# Patient Record
Sex: MALE | Race: WHITE | Employment: OTHER | URBAN - METROPOLITAN AREA
[De-identification: names, ages, dates, MRNs, and addresses within clinical notes are randomized per-mention and may not be internally consistent; named-entity substitution may affect disease eponyms.]

---

## 2023-11-06 ENCOUNTER — PREP FOR PROCEDURE (OUTPATIENT)
Dept: ORTHOPEDIC SURGERY | Age: 63
End: 2023-11-06

## 2023-11-06 ENCOUNTER — OFFICE VISIT (OUTPATIENT)
Dept: ORTHOPEDIC SURGERY | Age: 63
End: 2023-11-06

## 2023-11-06 DIAGNOSIS — S42.332A CLOSED DISPLACED OBLIQUE FRACTURE OF SHAFT OF LEFT HUMERUS, INITIAL ENCOUNTER: ICD-10-CM

## 2023-11-06 DIAGNOSIS — M19.012 DEGENERATIVE JOINT DISEASE OF LEFT ACROMIOCLAVICULAR JOINT: ICD-10-CM

## 2023-11-06 DIAGNOSIS — S42.232A CLOSED 3-PART FRACTURE OF SURGICAL NECK OF LEFT HUMERUS, INITIAL ENCOUNTER: Primary | ICD-10-CM

## 2023-11-06 DIAGNOSIS — M19.012 OSTEOARTHRITIS OF LEFT GLENOHUMERAL JOINT: ICD-10-CM

## 2023-11-06 RX ORDER — SODIUM CHLORIDE 0.9 % (FLUSH) 0.9 %
5-40 SYRINGE (ML) INJECTION PRN
Status: CANCELLED | OUTPATIENT
Start: 2023-11-06

## 2023-11-06 RX ORDER — SODIUM CHLORIDE 9 MG/ML
INJECTION, SOLUTION INTRAVENOUS PRN
Status: CANCELLED | OUTPATIENT
Start: 2023-11-06

## 2023-11-06 RX ORDER — SODIUM CHLORIDE 0.9 % (FLUSH) 0.9 %
5-40 SYRINGE (ML) INJECTION EVERY 12 HOURS SCHEDULED
Status: CANCELLED | OUTPATIENT
Start: 2023-11-06

## 2023-11-06 NOTE — PROGRESS NOTES
threatens bodily function    Follow up: No follow-ups on file.      I25.781  M19.012  S42.904 [id number]    EtOH prophylaxis    Mushtaq Russell MD

## 2023-11-07 ENCOUNTER — TELEPHONE (OUTPATIENT)
Dept: ORTHOPEDIC SURGERY | Age: 63
End: 2023-11-07

## 2023-11-07 PROBLEM — S42.332A: Status: ACTIVE | Noted: 2023-11-07

## 2023-11-07 NOTE — H&P
Subjective:     Patient is a 58 y.o. RHD MALE WITH LEFT SHOULDER PAIN. SEE OFFICE NOTE. Patient Active Problem List    Diagnosis Date Noted    Closed 3-part fracture of surgical neck of left humerus 11/06/2023    Displaced oblique fracture of shaft of left humerus 11/07/2023    Osteoarthritis of left glenohumeral joint 11/06/2023     No past medical history on file. No past surgical history on file. Prior to Admission medications    Not on File     Not on File   Social History     Tobacco Use    Smoking status: Former     Types: Cigarettes    Smokeless tobacco: Never   Substance Use Topics    Alcohol use: Not on file      No family history on file. Review of Systems  Pertinent items are noted in HPI. Objective:     No data found. There were no vitals taken for this visit. General Appearance:    Alert, cooperative, no distress, appears stated age   Head:    Normocephalic, without obvious abnormality, atraumatic                       Back:     Symmetric, no curvature, ROM normal, no CVA tenderness   Lungs:     Clear to auscultation bilaterally, respirations unlabored   Chest wall:    No tenderness or deformity   Heart:    Regular rate and rhythm, S1 and S2 normal, no murmur, rub   or gallop               Extremities:   Extremities normal, atraumatic, no cyanosis or edema   Pulses:   2+ and symmetric all extremities   Skin:   Skin color, texture, turgor normal, no rashes or lesions   Lymph nodes:   Cervical, supraclavicular, and axillary nodes normal   Neurologic:   CNII-XII intact. Normal strength, sensation and reflexes       throughout           Assessment:     Active Problems:    Closed 3-part fracture of surgical neck of left humerus    Displaced oblique fracture of shaft of left humerus    Osteoarthritis of left glenohumeral joint  Resolved Problems:    * No resolved hospital problems. *      Plan:     The various methods of treatment have been discussed with the patient and family.

## 2023-11-07 NOTE — TELEPHONE ENCOUNTER
He is also wondering if he can get a refill of his Oxycodone sent to [facility] since he is having to move his surgery due to transportation issues.

## 2023-11-09 ENCOUNTER — TELEPHONE (OUTPATIENT)
Dept: ORTHOPEDIC SURGERY | Age: 63
End: 2023-11-09

## 2023-11-09 DIAGNOSIS — S42.232A CLOSED 3-PART FRACTURE OF SURGICAL NECK OF LEFT HUMERUS, INITIAL ENCOUNTER: Primary | ICD-10-CM

## 2023-11-09 RX ORDER — OXYCODONE HYDROCHLORIDE 10 MG/1
10 TABLET ORAL EVERY 6 HOURS PRN
Qty: 40 TABLET | Refills: 0 | Status: SHIPPED | OUTPATIENT
Start: 2023-11-09 | End: 2023-11-19

## 2023-11-09 NOTE — TELEPHONE ENCOUNTER
Called and spoke to pt's wife. They will use [employer] and rx was routed to P to go to another pharmacy.

## 2023-11-10 ENCOUNTER — HOSPITAL ENCOUNTER (OUTPATIENT)
Dept: SURGERY | Age: 63
Discharge: HOME | End: 2023-11-10
Payer: COMMERCIAL

## 2023-11-10 VITALS
OXYGEN SATURATION: 98 % | HEIGHT: 69 IN | HEART RATE: 109 BPM | SYSTOLIC BLOOD PRESSURE: 102 MMHG | DIASTOLIC BLOOD PRESSURE: 62 MMHG | BODY MASS INDEX: 24.82 KG/M2 | TEMPERATURE: 97.5 F | RESPIRATION RATE: 18 BRPM | WEIGHT: 167.6 LBS

## 2023-11-10 DIAGNOSIS — S42.232A CLOSED 3-PART FRACTURE OF SURGICAL NECK OF LEFT HUMERUS, INITIAL ENCOUNTER: ICD-10-CM

## 2023-11-10 LAB
ALBUMIN SERPL-MCNC: 3.1 G/DL (ref 3.2–4.6)
ALBUMIN/GLOB SERPL: 0.8 (ref 0.4–1.6)
ALP SERPL-CCNC: 58 U/L (ref 50–136)
ALT SERPL-CCNC: 15 U/L (ref 12–65)
ANION GAP SERPL CALC-SCNC: 10 MMOL/L (ref 2–11)
APPEARANCE UR: CLEAR
AST SERPL-CCNC: 24 U/L (ref 15–37)
BACTERIA URNS QL MICRO: NEGATIVE /HPF
BASOPHILS # BLD: 0.1 K/UL (ref 0–0.2)
BASOPHILS NFR BLD: 0 % (ref 0–2)
BILIRUB SERPL-MCNC: 0.5 MG/DL (ref 0.2–1.1)
BILIRUB UR QL: NEGATIVE
BUN SERPL-MCNC: 21 MG/DL (ref 8–23)
CALCIUM SERPL-MCNC: 9.9 MG/DL (ref 8.3–10.4)
CASTS URNS QL MICRO: ABNORMAL /LPF
CHLORIDE SERPL-SCNC: 102 MMOL/L (ref 101–110)
CO2 SERPL-SCNC: 22 MMOL/L (ref 21–32)
COLOR UR: ABNORMAL
CREAT SERPL-MCNC: 1.56 MG/DL (ref 0.8–1.5)
DIFFERENTIAL METHOD BLD: ABNORMAL
EOSINOPHIL # BLD: 0.4 K/UL (ref 0–0.8)
EOSINOPHIL NFR BLD: 2 % (ref 0.5–7.8)
EPI CELLS #/AREA URNS HPF: ABNORMAL /HPF
ERYTHROCYTE [DISTWIDTH] IN BLOOD BY AUTOMATED COUNT: 14.3 % (ref 11.9–14.6)
GLOBULIN SER CALC-MCNC: 3.7 G/DL (ref 2.8–4.5)
GLUCOSE SERPL-MCNC: 106 MG/DL (ref 65–100)
GLUCOSE UR STRIP.AUTO-MCNC: NEGATIVE MG/DL
HCT VFR BLD AUTO: 24.9 % (ref 41.1–50.3)
HGB BLD-MCNC: 8 G/DL (ref 13.6–17.2)
HGB UR QL STRIP: NEGATIVE
IMM GRANULOCYTES # BLD AUTO: 0.1 K/UL (ref 0–0.5)
IMM GRANULOCYTES NFR BLD AUTO: 1 % (ref 0–5)
INR PPP: 1.1
KETONES UR QL STRIP.AUTO: NEGATIVE MG/DL
LEUKOCYTE ESTERASE UR QL STRIP.AUTO: ABNORMAL
LYMPHOCYTES # BLD: 2.4 K/UL (ref 0.5–4.6)
LYMPHOCYTES NFR BLD: 15 % (ref 13–44)
MAGNESIUM SERPL-MCNC: 2.1 MG/DL (ref 1.8–2.4)
MCH RBC QN AUTO: 31.4 PG (ref 26.1–32.9)
MCHC RBC AUTO-ENTMCNC: 32.1 G/DL (ref 31.4–35)
MCV RBC AUTO: 97.6 FL (ref 82–102)
MONOCYTES # BLD: 1.2 K/UL (ref 0.1–1.3)
MONOCYTES NFR BLD: 7 % (ref 4–12)
NEUTS SEG # BLD: 12.1 K/UL (ref 1.7–8.2)
NEUTS SEG NFR BLD: 74 % (ref 43–78)
NITRITE UR QL STRIP.AUTO: NEGATIVE
NRBC # BLD: 0 K/UL (ref 0–0.2)
PH UR STRIP: 6.5 (ref 5–9)
PLATELET # BLD AUTO: 533 K/UL (ref 150–450)
PMV BLD AUTO: 9.9 FL (ref 9.4–12.3)
POTASSIUM SERPL-SCNC: 4.2 MMOL/L (ref 3.5–5.1)
PROT SERPL-MCNC: 6.8 G/DL (ref 6.3–8.2)
PROT UR STRIP-MCNC: NEGATIVE MG/DL
PROTHROMBIN TIME: 14.5 SEC (ref 12.6–14.3)
RBC # BLD AUTO: 2.55 M/UL (ref 4.23–5.6)
RBC #/AREA URNS HPF: ABNORMAL /HPF
SODIUM SERPL-SCNC: 134 MMOL/L (ref 133–143)
SP GR UR REFRACTOMETRY: 1.01 (ref 1–1.02)
UROBILINOGEN UR QL STRIP.AUTO: 0.2 EU/DL (ref 0.2–1)
WBC # BLD AUTO: 16.2 K/UL (ref 4.3–11.1)
WBC URNS QL MICRO: ABNORMAL /HPF

## 2023-11-10 PROCEDURE — 80053 COMPREHEN METABOLIC PANEL: CPT

## 2023-11-10 PROCEDURE — 83735 ASSAY OF MAGNESIUM: CPT

## 2023-11-10 PROCEDURE — 82306 VITAMIN D 25 HYDROXY: CPT

## 2023-11-10 PROCEDURE — 87641 MR-STAPH DNA AMP PROBE: CPT

## 2023-11-10 PROCEDURE — 81001 URINALYSIS AUTO W/SCOPE: CPT

## 2023-11-10 PROCEDURE — 85610 PROTHROMBIN TIME: CPT

## 2023-11-10 PROCEDURE — 85025 COMPLETE CBC W/AUTO DIFF WBC: CPT

## 2023-11-10 RX ORDER — FENOFIBRATE 145 MG/1
145 TABLET, COATED ORAL
Qty: 30 TABLET | Refills: 11 | COMMUNITY
Start: 2023-10-19 | End: 2024-10-18

## 2023-11-10 RX ORDER — ASPIRIN 81 MG/1
81 TABLET ORAL
COMMUNITY

## 2023-11-10 RX ORDER — ROSUVASTATIN CALCIUM 20 MG/1
20 TABLET, COATED ORAL
Qty: 30 TABLET | Refills: 11 | COMMUNITY
Start: 2023-10-19 | End: 2024-10-18

## 2023-11-10 RX ORDER — PROBENECID AND COLCHICINE 500; .5 MG/1; MG/1
1 TABLET ORAL 2 TIMES DAILY PRN
COMMUNITY
Start: 2023-06-07

## 2023-11-10 RX ORDER — FOLIC ACID 1 MG/1
1 TABLET ORAL
Qty: 30 TABLET | Refills: 11 | COMMUNITY
Start: 2023-06-07 | End: 2024-06-06

## 2023-11-10 RX ORDER — LANOLIN ALCOHOL/MO/W.PET/CERES
100 CREAM (GRAM) TOPICAL
Qty: 30 TABLET | Refills: 11 | COMMUNITY
Start: 2023-06-07 | End: 2024-06-06

## 2023-11-10 RX ORDER — PSEUDOEPHEDRINE HCL 30 MG
100 TABLET ORAL 2 TIMES DAILY
COMMUNITY
Start: 2023-11-06 | End: 2023-11-13

## 2023-11-10 RX ORDER — CALCIUM CARBONATE/VITAMIN D3 600 MG-10
1 TABLET ORAL
COMMUNITY
Start: 2023-06-07

## 2023-11-10 RX ORDER — FAMOTIDINE 20 MG/1
20 TABLET, FILM COATED ORAL 2 TIMES DAILY
Qty: 60 TABLET | Refills: 11 | COMMUNITY
Start: 2023-06-07 | End: 2024-06-06

## 2023-11-10 RX ORDER — METOPROLOL SUCCINATE 100 MG/1
100 TABLET, EXTENDED RELEASE ORAL
Qty: 30 TABLET | Refills: 11 | COMMUNITY
Start: 2023-10-19 | End: 2024-10-18

## 2023-11-10 RX ORDER — MAGNESIUM OXIDE 400 MG/1
400 TABLET ORAL 2 TIMES DAILY
COMMUNITY

## 2023-11-10 RX ORDER — OMEPRAZOLE 20 MG/1
20 CAPSULE, DELAYED RELEASE ORAL 2 TIMES DAILY
COMMUNITY
Start: 2023-06-07

## 2023-11-10 ASSESSMENT — PAIN DESCRIPTION - LOCATION: LOCATION: SHOULDER;ARM

## 2023-11-10 ASSESSMENT — PAIN DESCRIPTION - DESCRIPTORS: DESCRIPTORS: ACHING;SHARP;STABBING

## 2023-11-10 ASSESSMENT — PAIN SCALES - GENERAL: PAINLEVEL_OUTOF10: 8

## 2023-11-10 ASSESSMENT — PAIN DESCRIPTION - ORIENTATION: ORIENTATION: LEFT

## 2023-11-10 NOTE — PERIOP NOTE
Patient verified name and . Order for consent found in EHR and matches case posting; patient verified. Type 3 surgery, walk in assessment complete. Labs per surgeon: CBC, CMP, PT/INR, MAG, U/A, Vit-D ; results pending. POC Glucose, T&C, Hgb-A1c to be collected dos. Labs per anesthesia protocol: no additional labs needed. EKG: last completed on 23 at [facility]. Fax sent to medical records requesting 2 most recent tracings. Echo (23), Heart cath (20), [facility] note (10/19/23), and PCP note (23) available in EHR for reference. Anesthesia to review cardiac records once EKG's are received. MRSA/MSSA swab collected; pharmacy to review and dose antibiotic as appropriate. Hospital approved surgical skin cleanser and instructions to return bottle on DOS given per hospital policy. Patient provided with handouts including Guide to Surgery, Pain Management, Hand Hygiene, Blood Transfusion Education, and [facility] Brochure. Patient answered medical/surgical history questions at their best of ability. All prior to admission medications documented in [facility]. Original medication prescription bottles NOT visualized during patient appointment. Patient instructed to hold all vitamins 3 weeks prior to surgery and NSAIDS 5 days prior to surgery. Patient teach back successful and patient demonstrates knowledge of instruction.

## 2023-11-10 NOTE — PERIOP NOTE
Received Rhythm strip (10/30/23) and EKG tracing's (7/4/23 & 3/28/23) from [facility]. Records scanned under media tab for reference.

## 2023-11-10 NOTE — PERIOP NOTE
PLEASE CONTINUE TAKING ALL PRESCRIPTION MEDICATIONS UP TO THE DAY OF SURGERY UNLESS OTHERWISE DIRECTED BELOW. You may take Tylenol, allergy, and/or indigestion medications. TAKE ONLY THESE MEDICATIONS ON THE DAY OF SURGERY ON 11/16/23      81 mg Aspirin, Famotidine, Metoprolol, Omeprazole, Oxycodone if needed,     Rosuvastatin, Entresto, Colchicine-Probenecid if needed          DISCONTINUE all vitamins, herbals, and supplements 7 days prior to surgery. DISCONTINUE Non-Steroidal Anti-Inflammatory (NSAIDS) such as Advil and Aleve 5 days prior to surgery. Home Medications to Hold- please continue all other medications except these. Stop Calcium/Vit-D        Comments      On the day before surgery (11/15/23) please take 2 Tylenol in the morning and then again before bed. You may use either regular or extra strength. Bring: Photo ID, Insurance card, Radha-hex soap, Incentive Spirometer        Please do not bring home medications with you on the day of surgery unless otherwise directed by your nurse. If you are instructed to bring home medications, please give them to your nurse as they will be administered by the nursing staff. If you have any questions, please call [address] [place] [address] [phone]. A copy of this note was provided to the patient for reference.

## 2023-11-10 NOTE — PERIOP NOTE
CBC, CMP, PT/INR, MAG, U/A are within anesthesia guidelines except for low HGB-8.0, elevated Creatinine-1.56, and elevated PT-14.5; anesthesia to review. Labs automatically routed to ordering provider via Epic documentation. Staff message sent to [facility] at Dr. Salazar Beverage office to inform them of elevated WBC count-16.2 & low HGB-8.0. No

## 2023-11-10 NOTE — PROGRESS NOTES
Total Joint Surgery Preoperative Chart Review      Patient ID:  Patt Armstrong  [id number]  87 y.o.  1960  Surgeon: Dr. Adriana Cabrera  Date of Surgery: 2023  Procedure: Total Left Shoulder Arthroplasty  Primary Care Physician: DEV Ware [phone]  Specialty Physician(s):      Subjective:   Patt Armstrong is a 58 y.o. White (non-) male who presents for preoperative evaluation for Total Left Shoulder arthroplasty. This is a preoperative chart review note based on data collected by the nurse at the surgical Pre-Assessment visit. Past Medical History:   Diagnosis Date    Acute pancreatitis     x1    Chronic pain     shoulders, lower back    Congestive heart failure (CHF) (HCC)     Coronary artery disease involving native coronary artery of native heart without angina pectoris     DDD (degenerative disc disease), lumbar     GERD (gastroesophageal reflux disease)     Gout     Hypercholesteremia     Hypertension     BRIDGER (iron deficiency anemia)     Left bundle branch block       Past Surgical History:   Procedure Laterality Date    CARDIAC CATHETERIZATION  2020    COLONOSCOPY  2021     History reviewed. No pertinent family history. Social History     Tobacco Use    Smoking status: Former     Packs/day: .75     Types: Cigarettes     Start date: 64     Quit date:      Years since quittin.8     Passive exposure: Past    Smokeless tobacco: Never   Substance Use Topics    Alcohol use: Yes     Comment: 14 drinks per week       Prior to Admission medications    Medication Sig Start Date End Date Taking?  Authorizing Provider   calcium carb-cholecalciferol 600-10 MG-MCG TABS per tab Take 1 tablet by mouth daily (with breakfast) 23  Yes Provider, MD Mukesh   colchicine-probenecid 0.5-500 MG per tablet Take 1 tablet by mouth 2 times daily as needed 23  Yes ProviderMukesh MD   docusate (COLACE, DULCOLAX) 100 MG CAPS Take 100 mg by mouth 2 4464 hour(s)). Data Review:   Labs:   Labs pending    Problem List:  )  Patient Active Problem List   Diagnosis    Closed 3-part fracture of surgical neck of left humerus    Osteoarthritis of left glenohumeral joint    Displaced oblique fracture of shaft of left humerus       Total Joint Surgery Pre-Assessment Recommendations:           Continuous saturation monitoring UPON ARRIVAL TO FLOOR. Heated high flow lung expansion x 24 hours and prn.   IP RT consult for respiratory evaluation every shift      Signed By: DEV Monson - CNP-C    November 10, 2023

## 2023-11-11 LAB
25(OH)D3 SERPL-MCNC: 26 NG/ML (ref 30–100)
MRSA DNA SPEC QL NAA+PROBE: NOT DETECTED
S AUREUS CPE NOSE QL NAA+PROBE: NOT DETECTED

## 2023-11-12 NOTE — H&P
Subjective:     Patient is a 58 y.o. RHD MALE WITH LEFT SHOULDER PAIN    SEE OFFICE NOTE. Patient Active Problem List    Diagnosis Date Noted    Closed 3-part fracture of surgical neck of left humerus 11/06/2023    Displaced oblique fracture of shaft of left humerus 11/07/2023    Osteoarthritis of left glenohumeral joint 11/06/2023     Past Medical History:   Diagnosis Date    Acute pancreatitis     x1    Alcohol abuse, daily use     as of 11/10/23 patient states about 14 drinks per week    Chronic pain     shoulders, lower back    Congestive heart failure (CHF) ([facility])     EF=45 - 50% on Echo dated 9/19/23    Coronary artery disease involving native coronary artery of native heart without angina pectoris     followed by [facility]    DDD (degenerative disc disease), lumbar     Dilated cardiomyopathy ([address])     Folic acid deficiency     Former smoker     GERD (gastroesophageal reflux disease)     on meds for control    Gout     prn med    Hypercholesteremia     on med for control    Hypertension     on med for control    Hypomagnesemia     BRIDGER (iron deficiency anemia)     not currently taking Iron    Left bundle branch block       Past Surgical History:   Procedure Laterality Date    CARDIAC CATHETERIZATION  07/28/2020    no intervention    COLONOSCOPY  01/11/2021      Prior to Admission medications    Medication Sig Start Date End Date Taking?  Authorizing Provider   magnesium oxide (MAG-OX) 400 MG tablet Take 1 tablet by mouth in the morning and at bedtime    Mukesh Valdez MD   aspirin 81 MG EC tablet Take 1 tablet by mouth Daily with lunch    Mukesh Valdez MD   calcium carb-cholecalciferol 600-10 MG-MCG TABS per tab Take 1 tablet by mouth daily (with breakfast) 6/7/23   Mukesh Valdez MD   colchicine-probenecid 0.5-500 MG per tablet Take 1 tablet by mouth 2 times daily as needed 6/7/23   Mukesh Valdez MD   docusate (COLACE, DULCOLAX) 100 MG CAPS Take 100 mg by mouth 2 times daily 23  Mukesh Valdez MD   famotidine (PEPCID) 20 MG tablet Take 1 tablet by mouth 2 times daily 23  Mukesh Valdez MD   fenofibrate (TRICOR) 145 MG tablet Take 1 tablet by mouth Daily with lunch 10/19/23 10/18/24  Mukesh Valdez MD   folic acid (FOLVITE) 1 MG tablet Take 1 tablet by mouth Daily with lunch 23  Mukesh Valdez MD   metoprolol succinate (TOPROL XL) 100 MG extended release tablet Take 1 tablet by mouth Daily with lunch 10/19/23 10/18/24  Mukesh Valdez MD   omeprazole (PRILOSEC) 20 MG delayed release capsule Take 1 capsule by mouth in the morning and at bedtime 23   Mukesh Valdez MD   rosuvastatin (CRESTOR) 20 MG tablet Take 1 tablet by mouth Daily with lunch 10/19/23 10/18/24  Mukesh Valdez MD   sacubitril-valsartan (ENTRESTO) 24-26 MG per tablet Take 1 tablet by mouth 2 times daily 10/19/23 10/18/24  Mukesh Valdez MD   thiamine 100 MG tablet Take 1 tablet by mouth Daily with lunch 23  Mukesh Valdez MD   oxyCODONE HCl (OXY-IR) 10 MG immediate release tablet Take 1 tablet by mouth every 6 hours as needed for Pain for up to 10 days. Max Daily Amount: 40 mg 23  Howard Malin MD   oxyCODONE HCl (OXY-IR) 10 MG immediate release tablet Take 1 tablet by mouth every 6 hours as needed for Pain for up to 10 days. Max Daily Amount: 40 mg 23  Howard Malin MD     No Known Allergies   Social History     Tobacco Use    Smoking status: Former     Packs/day: 0.75     Years: 28.00     Additional pack years: 0.00     Total pack years: 21.00     Types: Cigarettes     Start date: 64     Quit date:      Years since quittin.8     Passive exposure: Past    Smokeless tobacco: Never   Substance Use Topics    Alcohol use: Yes     Comment: 14 drinks per week; mostly liquor      No family history on file.    Review of Systems  Pertinent items are noted in

## 2023-11-13 NOTE — PROGRESS NOTES
Dr. Joe Payne reviewed chart. New order received \"repeat Hgb, INR, T&S DOS. \" [facility] to proceed.

## 2023-11-15 ENCOUNTER — ANESTHESIA EVENT (OUTPATIENT)
Dept: SURGERY | Age: 63
End: 2023-11-15
Payer: COMMERCIAL

## 2023-11-16 ENCOUNTER — HOME HEALTH ADMISSION (OUTPATIENT)
Dept: HOME HEALTH SERVICES | Facility: HOME HEALTH | Age: 63
End: 2023-11-16
Payer: COMMERCIAL

## 2023-11-16 ENCOUNTER — APPOINTMENT (OUTPATIENT)
Dept: GENERAL RADIOLOGY | Age: 63
DRG: 483 | End: 2023-11-16
Attending: ORTHOPAEDIC SURGERY
Payer: COMMERCIAL

## 2023-11-16 ENCOUNTER — ANESTHESIA (OUTPATIENT)
Dept: SURGERY | Age: 63
End: 2023-11-16
Payer: COMMERCIAL

## 2023-11-16 ENCOUNTER — HOSPITAL ENCOUNTER (INPATIENT)
Age: 63
LOS: 2 days | Discharge: HOME | DRG: 483 | End: 2023-11-18
Attending: ORTHOPAEDIC SURGERY | Admitting: ORTHOPAEDIC SURGERY
Payer: COMMERCIAL

## 2023-11-16 DIAGNOSIS — S42.232A CLOSED 3-PART FRACTURE OF SURGICAL NECK OF LEFT HUMERUS, INITIAL ENCOUNTER: ICD-10-CM

## 2023-11-16 PROBLEM — I50.9 HEART FAILURE (HCC): Status: ACTIVE | Noted: 2023-11-16

## 2023-11-16 PROBLEM — I10 PRIMARY HYPERTENSION: Status: ACTIVE | Noted: 2023-11-16

## 2023-11-16 PROBLEM — D50.9 IDA (IRON DEFICIENCY ANEMIA): Status: ACTIVE | Noted: 2023-11-16

## 2023-11-16 PROBLEM — E78.2 MIXED HYPERLIPIDEMIA: Status: ACTIVE | Noted: 2023-11-16

## 2023-11-16 PROBLEM — K21.9 GASTROESOPHAGEAL REFLUX DISEASE WITHOUT ESOPHAGITIS: Status: ACTIVE | Noted: 2023-11-16

## 2023-11-16 LAB
ABO + RH BLD: NORMAL
ANION GAP SERPL CALC-SCNC: 7 MMOL/L (ref 2–11)
BASOPHILS # BLD: 0.1 K/UL (ref 0–0.2)
BASOPHILS NFR BLD: 0 % (ref 0–2)
BLOOD GROUP ANTIBODIES SERPL: NORMAL
BUN SERPL-MCNC: 17 MG/DL (ref 8–23)
CALCIUM SERPL-MCNC: 9.5 MG/DL (ref 8.3–10.4)
CHLORIDE SERPL-SCNC: 104 MMOL/L (ref 101–110)
CO2 SERPL-SCNC: 21 MMOL/L (ref 21–32)
CREAT SERPL-MCNC: 1.74 MG/DL (ref 0.8–1.5)
DIFFERENTIAL METHOD BLD: ABNORMAL
EOSINOPHIL # BLD: 0.1 K/UL (ref 0–0.8)
EOSINOPHIL NFR BLD: 1 % (ref 0.5–7.8)
ERYTHROCYTE [DISTWIDTH] IN BLOOD BY AUTOMATED COUNT: 14.7 % (ref 11.9–14.6)
EST. AVERAGE GLUCOSE BLD GHB EST-MCNC: 88 MG/DL
GLUCOSE BLD STRIP.AUTO-MCNC: 108 MG/DL (ref 65–100)
GLUCOSE SERPL-MCNC: 133 MG/DL (ref 65–100)
HBA1C MFR BLD: 4.7 % (ref 4.8–5.6)
HCT VFR BLD AUTO: 26.8 % (ref 41.1–50.3)
HGB BLD-MCNC: 8.5 G/DL (ref 13.6–17.2)
HGB BLD-MCNC: 9.5 G/DL (ref 13.6–17.2)
IMM GRANULOCYTES # BLD AUTO: 0.1 K/UL (ref 0–0.5)
IMM GRANULOCYTES NFR BLD AUTO: 1 % (ref 0–5)
INR BLD: 1.1 (ref 0.9–1.2)
LYMPHOCYTES # BLD: 1.2 K/UL (ref 0.5–4.6)
LYMPHOCYTES NFR BLD: 10 % (ref 13–44)
MAGNESIUM SERPL-MCNC: 2 MG/DL (ref 1.8–2.4)
MCH RBC QN AUTO: 30.7 PG (ref 26.1–32.9)
MCHC RBC AUTO-ENTMCNC: 31.7 G/DL (ref 31.4–35)
MCV RBC AUTO: 96.8 FL (ref 82–102)
MONOCYTES # BLD: 0.6 K/UL (ref 0.1–1.3)
MONOCYTES NFR BLD: 5 % (ref 4–12)
NEUTS SEG # BLD: 10.3 K/UL (ref 1.7–8.2)
NEUTS SEG NFR BLD: 83 % (ref 43–78)
NRBC # BLD: 0 K/UL (ref 0–0.2)
PLATELET # BLD AUTO: 579 K/UL (ref 150–450)
PMV BLD AUTO: 9.3 FL (ref 9.4–12.3)
POTASSIUM SERPL-SCNC: 4.3 MMOL/L (ref 3.5–5.1)
PT BLD: 13.5 SECS (ref 9.6–11.6)
RBC # BLD AUTO: 2.77 M/UL (ref 4.23–5.6)
SERVICE CMNT-IMP: ABNORMAL
SODIUM SERPL-SCNC: 132 MMOL/L (ref 133–143)
SPECIMEN EXP DATE BLD: NORMAL
WBC # BLD AUTO: 12.4 K/UL (ref 4.3–11.1)

## 2023-11-16 PROCEDURE — 73060 X-RAY EXAM OF HUMERUS: CPT

## 2023-11-16 PROCEDURE — 6360000002 HC RX W HCPCS: Performed by: ANESTHESIOLOGY

## 2023-11-16 PROCEDURE — 6370000000 HC RX 637 (ALT 250 FOR IP): Performed by: ORTHOPAEDIC SURGERY

## 2023-11-16 PROCEDURE — 64415 NJX AA&/STRD BRCH PLXS IMG: CPT | Performed by: ANESTHESIOLOGY

## 2023-11-16 PROCEDURE — 1100000000 HC RM PRIVATE

## 2023-11-16 PROCEDURE — 85018 HEMOGLOBIN: CPT

## 2023-11-16 PROCEDURE — 2500000003 HC RX 250 WO HCPCS: Performed by: ANESTHESIOLOGY

## 2023-11-16 PROCEDURE — 0PSG04Z REPOSITION LEFT HUMERAL SHAFT WITH INTERNAL FIXATION DEVICE, OPEN APPROACH: ICD-10-PCS | Performed by: ORTHOPAEDIC SURGERY

## 2023-11-16 PROCEDURE — 83036 HEMOGLOBIN GLYCOSYLATED A1C: CPT

## 2023-11-16 PROCEDURE — 3600000015 HC SURGERY LEVEL 5 ADDTL 15MIN: Performed by: ORTHOPAEDIC SURGERY

## 2023-11-16 PROCEDURE — 3700000000 HC ANESTHESIA ATTENDED CARE: Performed by: ORTHOPAEDIC SURGERY

## 2023-11-16 PROCEDURE — 85025 COMPLETE CBC W/AUTO DIFF WBC: CPT

## 2023-11-16 PROCEDURE — 6370000000 HC RX 637 (ALT 250 FOR IP): Performed by: ANESTHESIOLOGY

## 2023-11-16 PROCEDURE — 2500000003 HC RX 250 WO HCPCS: Performed by: NURSE ANESTHETIST, CERTIFIED REGISTERED

## 2023-11-16 PROCEDURE — C1713 ANCHOR/SCREW BN/BN,TIS/BN: HCPCS | Performed by: ORTHOPAEDIC SURGERY

## 2023-11-16 PROCEDURE — 86900 BLOOD TYPING SEROLOGIC ABO: CPT

## 2023-11-16 PROCEDURE — 3700000001 HC ADD 15 MINUTES (ANESTHESIA): Performed by: ORTHOPAEDIC SURGERY

## 2023-11-16 PROCEDURE — 6360000002 HC RX W HCPCS: Performed by: ORTHOPAEDIC SURGERY

## 2023-11-16 PROCEDURE — 0RHK04Z INSERTION OF INTERNAL FIXATION DEVICE INTO LEFT SHOULDER JOINT, OPEN APPROACH: ICD-10-PCS | Performed by: ORTHOPAEDIC SURGERY

## 2023-11-16 PROCEDURE — 86850 RBC ANTIBODY SCREEN: CPT

## 2023-11-16 PROCEDURE — 2580000003 HC RX 258: Performed by: ANESTHESIOLOGY

## 2023-11-16 PROCEDURE — 86901 BLOOD TYPING SEROLOGIC RH(D): CPT

## 2023-11-16 PROCEDURE — 2580000003 HC RX 258: Performed by: NURSE ANESTHETIST, CERTIFIED REGISTERED

## 2023-11-16 PROCEDURE — 3E0T3BZ INTRODUCTION OF ANESTHETIC AGENT INTO PERIPHERAL NERVES AND PLEXI, PERCUTANEOUS APPROACH: ICD-10-PCS | Performed by: ANESTHESIOLOGY

## 2023-11-16 PROCEDURE — 82962 GLUCOSE BLOOD TEST: CPT

## 2023-11-16 PROCEDURE — 94760 N-INVAS EAR/PLS OXIMETRY 1: CPT

## 2023-11-16 PROCEDURE — 85610 PROTHROMBIN TIME: CPT

## 2023-11-16 PROCEDURE — 2709999900 HC NON-CHARGEABLE SUPPLY: Performed by: ORTHOPAEDIC SURGERY

## 2023-11-16 PROCEDURE — 94761 N-INVAS EAR/PLS OXIMETRY MLT: CPT

## 2023-11-16 PROCEDURE — 3600000005 HC SURGERY LEVEL 5 BASE: Performed by: ORTHOPAEDIC SURGERY

## 2023-11-16 PROCEDURE — 83735 ASSAY OF MAGNESIUM: CPT

## 2023-11-16 PROCEDURE — 73030 X-RAY EXAM OF SHOULDER: CPT

## 2023-11-16 PROCEDURE — 7100000001 HC PACU RECOVERY - ADDTL 15 MIN: Performed by: ORTHOPAEDIC SURGERY

## 2023-11-16 PROCEDURE — 2500000003 HC RX 250 WO HCPCS: Performed by: ORTHOPAEDIC SURGERY

## 2023-11-16 PROCEDURE — 0RRK00Z REPLACEMENT OF LEFT SHOULDER JOINT WITH REVERSE BALL AND SOCKET SYNTHETIC SUBSTITUTE, OPEN APPROACH: ICD-10-PCS | Performed by: ORTHOPAEDIC SURGERY

## 2023-11-16 PROCEDURE — 6360000002 HC RX W HCPCS: Performed by: NURSE ANESTHETIST, CERTIFIED REGISTERED

## 2023-11-16 PROCEDURE — C1776 JOINT DEVICE (IMPLANTABLE): HCPCS | Performed by: ORTHOPAEDIC SURGERY

## 2023-11-16 PROCEDURE — 2580000003 HC RX 258: Performed by: ORTHOPAEDIC SURGERY

## 2023-11-16 PROCEDURE — 0LS40ZZ REPOSITION LEFT UPPER ARM TENDON, OPEN APPROACH: ICD-10-PCS | Performed by: ORTHOPAEDIC SURGERY

## 2023-11-16 PROCEDURE — 2700000000 HC OXYGEN THERAPY PER DAY

## 2023-11-16 PROCEDURE — 7100000000 HC PACU RECOVERY - FIRST 15 MIN: Performed by: ORTHOPAEDIC SURGERY

## 2023-11-16 PROCEDURE — 2720000010 HC SURG SUPPLY STERILE: Performed by: ORTHOPAEDIC SURGERY

## 2023-11-16 PROCEDURE — 80048 BASIC METABOLIC PNL TOTAL CA: CPT

## 2023-11-16 DEVICE — SPACER HUM +9MM OFFSET SHLDR POLYETH FOR DELT XTEND REV SYS: Type: IMPLANTABLE DEVICE | Site: SHOULDER | Status: FUNCTIONAL

## 2023-11-16 DEVICE — SCREW BNE L50MM DIA4.5MM PROX CORT TIB S STL ST LOK FULL: Type: IMPLANTABLE DEVICE | Site: SHOULDER | Status: FUNCTIONAL

## 2023-11-16 DEVICE — CUP HUM DIA38MM +9MM OFFSET STD SHLDR POLYETH DELT XTEND: Type: IMPLANTABLE DEVICE | Site: SHOULDER | Status: FUNCTIONAL

## 2023-11-16 DEVICE — STEM HUM SZ 1 DIA8MM LNG CO CHROM CEM MONOBLOC EPIPHYSIS: Type: IMPLANTABLE DEVICE | Site: SHOULDER | Status: FUNCTIONAL

## 2023-11-16 DEVICE — CEMENT BNE 20ML 41GM FULL DOSE PMMA W/ TOBRA M VISC RADPQ: Type: IMPLANTABLE DEVICE | Site: SHOULDER | Status: FUNCTIONAL

## 2023-11-16 DEVICE — SCREW BNE L40MM DIA4.5MM PROX CORT TIB S STL ST LOK FULL: Type: IMPLANTABLE DEVICE | Site: SHOULDER | Status: FUNCTIONAL

## 2023-11-16 DEVICE — SCREW BNE L18MM DIA4.5MM PROX CORT TIB S STL ST LOK FULL: Type: IMPLANTABLE DEVICE | Site: SHOULDER | Status: FUNCTIONAL

## 2023-11-16 DEVICE — XTND GLENO ECC D38MM +6MM: Type: IMPLANTABLE DEVICE | Site: SHOULDER | Status: FUNCTIONAL

## 2023-11-16 DEVICE — COMPONENT GLEN FIX DIA10MM SHLDR METAGLENE LNG PEG GLOB: Type: IMPLANTABLE DEVICE | Site: SHOULDER | Status: FUNCTIONAL

## 2023-11-16 DEVICE — PLUG, CEMENT SZ. 8.0
Type: IMPLANTABLE DEVICE | Site: SHOULDER | Status: FUNCTIONAL
Brand: DJO SURGICAL

## 2023-11-16 RX ORDER — ROCURONIUM BROMIDE 10 MG/ML
INJECTION, SOLUTION INTRAVENOUS PRN
Status: DISCONTINUED | OUTPATIENT
Start: 2023-11-16 | End: 2023-11-16 | Stop reason: SDUPTHER

## 2023-11-16 RX ORDER — SODIUM CHLORIDE 9 MG/ML
INJECTION, SOLUTION INTRAVENOUS PRN
Status: DISCONTINUED | OUTPATIENT
Start: 2023-11-16 | End: 2023-11-16 | Stop reason: HOSPADM

## 2023-11-16 RX ORDER — METOPROLOL SUCCINATE 25 MG/1
100 TABLET, EXTENDED RELEASE ORAL
Status: DISCONTINUED | OUTPATIENT
Start: 2023-11-17 | End: 2023-11-16

## 2023-11-16 RX ORDER — SODIUM CHLORIDE, SODIUM LACTATE, POTASSIUM CHLORIDE, CALCIUM CHLORIDE 600; 310; 30; 20 MG/100ML; MG/100ML; MG/100ML; MG/100ML
INJECTION, SOLUTION INTRAVENOUS CONTINUOUS
Status: DISCONTINUED | OUTPATIENT
Start: 2023-11-16 | End: 2023-11-16 | Stop reason: HOSPADM

## 2023-11-16 RX ORDER — FENOFIBRATE 160 MG/1
160 TABLET ORAL
Status: DISCONTINUED | OUTPATIENT
Start: 2023-11-17 | End: 2023-11-16

## 2023-11-16 RX ORDER — DOCUSATE SODIUM 100 MG/1
100 CAPSULE, LIQUID FILLED ORAL 2 TIMES DAILY
Status: DISCONTINUED | OUTPATIENT
Start: 2023-11-17 | End: 2023-11-18 | Stop reason: HOSPADM

## 2023-11-16 RX ORDER — FOLIC ACID 1 MG/1
1 TABLET ORAL DAILY
Status: DISCONTINUED | OUTPATIENT
Start: 2023-11-16 | End: 2023-11-18 | Stop reason: HOSPADM

## 2023-11-16 RX ORDER — LIDOCAINE HYDROCHLORIDE 20 MG/ML
INJECTION, SOLUTION EPIDURAL; INFILTRATION; INTRACAUDAL; PERINEURAL PRN
Status: DISCONTINUED | OUTPATIENT
Start: 2023-11-16 | End: 2023-11-16 | Stop reason: SDUPTHER

## 2023-11-16 RX ORDER — OXYCODONE HYDROCHLORIDE 5 MG/1
10 TABLET ORAL
Status: DISCONTINUED | OUTPATIENT
Start: 2023-11-16 | End: 2023-11-18 | Stop reason: HOSPADM

## 2023-11-16 RX ORDER — ESMOLOL HYDROCHLORIDE 10 MG/ML
INJECTION, SOLUTION INTRAVENOUS PRN
Status: DISCONTINUED | OUTPATIENT
Start: 2023-11-16 | End: 2023-11-16 | Stop reason: SDUPTHER

## 2023-11-16 RX ORDER — SODIUM CHLORIDE 0.9 % (FLUSH) 0.9 %
5-40 SYRINGE (ML) INJECTION PRN
Status: DISCONTINUED | OUTPATIENT
Start: 2023-11-16 | End: 2023-11-18 | Stop reason: HOSPADM

## 2023-11-16 RX ORDER — FENOFIBRATE 160 MG/1
160 TABLET ORAL
Status: DISCONTINUED | OUTPATIENT
Start: 2023-11-16 | End: 2023-11-18 | Stop reason: HOSPADM

## 2023-11-16 RX ORDER — PROMETHAZINE HYDROCHLORIDE 25 MG/1
25 TABLET ORAL EVERY 4 HOURS PRN
Status: DISCONTINUED | OUTPATIENT
Start: 2023-11-16 | End: 2023-11-18 | Stop reason: HOSPADM

## 2023-11-16 RX ORDER — ASPIRIN 81 MG/1
81 TABLET ORAL
Status: DISCONTINUED | OUTPATIENT
Start: 2023-11-17 | End: 2023-11-18 | Stop reason: HOSPADM

## 2023-11-16 RX ORDER — SODIUM CHLORIDE 9 MG/ML
INJECTION, SOLUTION INTRAVENOUS PRN
Status: DISCONTINUED | OUTPATIENT
Start: 2023-11-16 | End: 2023-11-18 | Stop reason: HOSPADM

## 2023-11-16 RX ORDER — EPHEDRINE SULFATE/0.9% NACL/PF 50 MG/5 ML
SYRINGE (ML) INTRAVENOUS PRN
Status: DISCONTINUED | OUTPATIENT
Start: 2023-11-16 | End: 2023-11-16 | Stop reason: SDUPTHER

## 2023-11-16 RX ORDER — SODIUM CHLORIDE 9 MG/ML
INJECTION, SOLUTION INTRAVENOUS PRN
Status: DISCONTINUED | OUTPATIENT
Start: 2023-11-16 | End: 2023-11-16

## 2023-11-16 RX ORDER — SODIUM CHLORIDE, SODIUM LACTATE, POTASSIUM CHLORIDE, CALCIUM CHLORIDE 600; 310; 30; 20 MG/100ML; MG/100ML; MG/100ML; MG/100ML
INJECTION, SOLUTION INTRAVENOUS CONTINUOUS
Status: DISCONTINUED | OUTPATIENT
Start: 2023-11-16 | End: 2023-11-18 | Stop reason: HOSPADM

## 2023-11-16 RX ORDER — PROPOFOL 10 MG/ML
INJECTION, EMULSION INTRAVENOUS PRN
Status: DISCONTINUED | OUTPATIENT
Start: 2023-11-16 | End: 2023-11-16 | Stop reason: SDUPTHER

## 2023-11-16 RX ORDER — SODIUM CHLORIDE 0.9 % (FLUSH) 0.9 %
5-40 SYRINGE (ML) INJECTION PRN
Status: DISCONTINUED | OUTPATIENT
Start: 2023-11-16 | End: 2023-11-16

## 2023-11-16 RX ORDER — SODIUM CHLORIDE 0.9 % (FLUSH) 0.9 %
5-40 SYRINGE (ML) INJECTION EVERY 12 HOURS SCHEDULED
Status: DISCONTINUED | OUTPATIENT
Start: 2023-11-16 | End: 2023-11-16 | Stop reason: HOSPADM

## 2023-11-16 RX ORDER — ENEMA 19; 7 G/133ML; G/133ML
1 ENEMA RECTAL
Status: DISPENSED | OUTPATIENT
Start: 2023-11-16 | End: 2023-11-17

## 2023-11-16 RX ORDER — SODIUM CHLORIDE 0.9 % (FLUSH) 0.9 %
5-40 SYRINGE (ML) INJECTION PRN
Status: DISCONTINUED | OUTPATIENT
Start: 2023-11-16 | End: 2023-11-16 | Stop reason: HOSPADM

## 2023-11-16 RX ORDER — SODIUM CHLORIDE 0.9 % (FLUSH) 0.9 %
5-40 SYRINGE (ML) INJECTION EVERY 12 HOURS SCHEDULED
Status: DISCONTINUED | OUTPATIENT
Start: 2023-11-16 | End: 2023-11-18 | Stop reason: HOSPADM

## 2023-11-16 RX ORDER — FERROUS SULFATE 325(65) MG
325 TABLET ORAL 2 TIMES DAILY WITH MEALS
Status: DISCONTINUED | OUTPATIENT
Start: 2023-11-17 | End: 2023-11-18 | Stop reason: HOSPADM

## 2023-11-16 RX ORDER — OXYCODONE HYDROCHLORIDE 5 MG/1
20 TABLET ORAL
Status: DISCONTINUED | OUTPATIENT
Start: 2023-11-16 | End: 2023-11-18 | Stop reason: HOSPADM

## 2023-11-16 RX ORDER — MIDAZOLAM HYDROCHLORIDE 2 MG/2ML
2 INJECTION, SOLUTION INTRAMUSCULAR; INTRAVENOUS
Status: COMPLETED | OUTPATIENT
Start: 2023-11-16 | End: 2023-11-16

## 2023-11-16 RX ORDER — BUPIVACAINE HYDROCHLORIDE AND EPINEPHRINE 5; 5 MG/ML; UG/ML
INJECTION, SOLUTION EPIDURAL; INTRACAUDAL; PERINEURAL
Status: DISCONTINUED | OUTPATIENT
Start: 2023-11-16 | End: 2023-11-16 | Stop reason: SDUPTHER

## 2023-11-16 RX ORDER — LANOLIN ALCOHOL/MO/W.PET/CERES
100 CREAM (GRAM) TOPICAL DAILY
Status: DISCONTINUED | OUTPATIENT
Start: 2023-11-16 | End: 2023-11-18 | Stop reason: HOSPADM

## 2023-11-16 RX ORDER — ONDANSETRON 2 MG/ML
4 INJECTION INTRAMUSCULAR; INTRAVENOUS
Status: DISCONTINUED | OUTPATIENT
Start: 2023-11-16 | End: 2023-11-16 | Stop reason: HOSPADM

## 2023-11-16 RX ORDER — LIDOCAINE HYDROCHLORIDE 10 MG/ML
1 INJECTION, SOLUTION INFILTRATION; PERINEURAL
Status: COMPLETED | OUTPATIENT
Start: 2023-11-16 | End: 2023-11-16

## 2023-11-16 RX ORDER — PROCHLORPERAZINE EDISYLATE 5 MG/ML
5 INJECTION INTRAMUSCULAR; INTRAVENOUS
Status: DISCONTINUED | OUTPATIENT
Start: 2023-11-16 | End: 2023-11-16 | Stop reason: HOSPADM

## 2023-11-16 RX ORDER — METOPROLOL SUCCINATE 25 MG/1
100 TABLET, EXTENDED RELEASE ORAL
Status: DISCONTINUED | OUTPATIENT
Start: 2023-11-16 | End: 2023-11-18 | Stop reason: HOSPADM

## 2023-11-16 RX ORDER — BISACODYL 10 MG
10 SUPPOSITORY, RECTAL RECTAL DAILY PRN
Status: DISCONTINUED | OUTPATIENT
Start: 2023-11-16 | End: 2023-11-18 | Stop reason: HOSPADM

## 2023-11-16 RX ORDER — DEXAMETHASONE SODIUM PHOSPHATE 10 MG/ML
INJECTION INTRAMUSCULAR; INTRAVENOUS PRN
Status: DISCONTINUED | OUTPATIENT
Start: 2023-11-16 | End: 2023-11-16 | Stop reason: SDUPTHER

## 2023-11-16 RX ORDER — ONDANSETRON 2 MG/ML
INJECTION INTRAMUSCULAR; INTRAVENOUS PRN
Status: DISCONTINUED | OUTPATIENT
Start: 2023-11-16 | End: 2023-11-16 | Stop reason: SDUPTHER

## 2023-11-16 RX ORDER — ONDANSETRON 4 MG/1
4 TABLET, ORALLY DISINTEGRATING ORAL EVERY 8 HOURS PRN
Status: DISCONTINUED | OUTPATIENT
Start: 2023-11-16 | End: 2023-11-18 | Stop reason: HOSPADM

## 2023-11-16 RX ORDER — DIPHENHYDRAMINE HYDROCHLORIDE 50 MG/ML
12.5 INJECTION INTRAMUSCULAR; INTRAVENOUS
Status: DISCONTINUED | OUTPATIENT
Start: 2023-11-16 | End: 2023-11-16 | Stop reason: HOSPADM

## 2023-11-16 RX ORDER — ALBUTEROL SULFATE 2.5 MG/3ML
2.5 SOLUTION RESPIRATORY (INHALATION) EVERY 6 HOURS PRN
Status: DISCONTINUED | OUTPATIENT
Start: 2023-11-16 | End: 2023-11-18 | Stop reason: HOSPADM

## 2023-11-16 RX ORDER — SODIUM CHLORIDE 0.9 % (FLUSH) 0.9 %
5-40 SYRINGE (ML) INJECTION EVERY 12 HOURS SCHEDULED
Status: DISCONTINUED | OUTPATIENT
Start: 2023-11-16 | End: 2023-11-16

## 2023-11-16 RX ORDER — OXYCODONE HYDROCHLORIDE 5 MG/1
10 TABLET ORAL PRN
Status: COMPLETED | OUTPATIENT
Start: 2023-11-16 | End: 2023-11-16

## 2023-11-16 RX ORDER — ROSUVASTATIN CALCIUM 20 MG/1
20 TABLET, COATED ORAL
Status: DISCONTINUED | OUTPATIENT
Start: 2023-11-16 | End: 2023-11-18 | Stop reason: HOSPADM

## 2023-11-16 RX ORDER — BUPIVACAINE HYDROCHLORIDE 2.5 MG/ML
INJECTION, SOLUTION INFILTRATION; PERINEURAL
Status: DISCONTINUED | OUTPATIENT
Start: 2023-11-16 | End: 2023-11-16 | Stop reason: SDUPTHER

## 2023-11-16 RX ORDER — GLYCOPYRROLATE 0.2 MG/ML
INJECTION INTRAMUSCULAR; INTRAVENOUS PRN
Status: DISCONTINUED | OUTPATIENT
Start: 2023-11-16 | End: 2023-11-16 | Stop reason: SDUPTHER

## 2023-11-16 RX ORDER — NEOSTIGMINE METHYLSULFATE 1 MG/ML
INJECTION, SOLUTION INTRAVENOUS PRN
Status: DISCONTINUED | OUTPATIENT
Start: 2023-11-16 | End: 2023-11-16 | Stop reason: SDUPTHER

## 2023-11-16 RX ORDER — OXYCODONE HYDROCHLORIDE 5 MG/1
5 TABLET ORAL PRN
Status: COMPLETED | OUTPATIENT
Start: 2023-11-16 | End: 2023-11-16

## 2023-11-16 RX ORDER — ACETAMINOPHEN 500 MG
1000 TABLET ORAL ONCE
Status: COMPLETED | OUTPATIENT
Start: 2023-11-16 | End: 2023-11-16

## 2023-11-16 RX ORDER — CHLORDIAZEPOXIDE HYDROCHLORIDE 10 MG/1
10 CAPSULE, GELATIN COATED ORAL EVERY 4 HOURS PRN
Status: DISCONTINUED | OUTPATIENT
Start: 2023-11-16 | End: 2023-11-18 | Stop reason: HOSPADM

## 2023-11-16 RX ADMIN — PHENYLEPHRINE HYDROCHLORIDE 100 MCG: 0.1 INJECTION, SOLUTION INTRAVENOUS at 11:44

## 2023-11-16 RX ADMIN — SODIUM CHLORIDE, PRESERVATIVE FREE 10 ML: 5 INJECTION INTRAVENOUS at 21:56

## 2023-11-16 RX ADMIN — TUBERCULIN PURIFIED PROTEIN DERIVATIVE 5 UNITS: 5 INJECTION, SOLUTION INTRADERMAL at 15:39

## 2023-11-16 RX ADMIN — PHENYLEPHRINE HYDROCHLORIDE 150 MCG: 0.1 INJECTION, SOLUTION INTRAVENOUS at 11:59

## 2023-11-16 RX ADMIN — OXYCODONE 10 MG: 5 TABLET ORAL at 16:43

## 2023-11-16 RX ADMIN — Medication 100 MG: at 16:35

## 2023-11-16 RX ADMIN — PHENYLEPHRINE HYDROCHLORIDE 100 MCG: 0.1 INJECTION, SOLUTION INTRAVENOUS at 11:40

## 2023-11-16 RX ADMIN — Medication 3 MG: at 13:21

## 2023-11-16 RX ADMIN — PHENYLEPHRINE HYDROCHLORIDE 50 MCG: 0.1 INJECTION, SOLUTION INTRAVENOUS at 11:58

## 2023-11-16 RX ADMIN — BUPIVACAINE HYDROCHLORIDE AND EPINEPHRINE BITARTRATE 10 ML: 5; .005 INJECTION, SOLUTION EPIDURAL; INTRACAUDAL; PERINEURAL at 10:56

## 2023-11-16 RX ADMIN — ROCURONIUM BROMIDE 10 MG: 50 INJECTION, SOLUTION INTRAVENOUS at 12:03

## 2023-11-16 RX ADMIN — PHENYLEPHRINE HYDROCHLORIDE 50 MCG: 0.1 INJECTION, SOLUTION INTRAVENOUS at 12:11

## 2023-11-16 RX ADMIN — ROCURONIUM BROMIDE 40 MG: 50 INJECTION, SOLUTION INTRAVENOUS at 11:14

## 2023-11-16 RX ADMIN — METOPROLOL SUCCINATE 100 MG: 25 TABLET, EXTENDED RELEASE ORAL at 15:39

## 2023-11-16 RX ADMIN — LIDOCAINE HYDROCHLORIDE 70 MG: 20 INJECTION, SOLUTION EPIDURAL; INFILTRATION; INTRACAUDAL; PERINEURAL at 11:14

## 2023-11-16 RX ADMIN — ESMOLOL HYDROCHLORIDE 30 MG: 10 INJECTION INTRAVENOUS at 13:32

## 2023-11-16 RX ADMIN — ROSUVASTATIN 20 MG: 20 TABLET, FILM COATED ORAL at 15:39

## 2023-11-16 RX ADMIN — SACUBITRIL AND VALSARTAN 1 TABLET: 24; 26 TABLET, FILM COATED ORAL at 21:51

## 2023-11-16 RX ADMIN — SODIUM CHLORIDE, POTASSIUM CHLORIDE, SODIUM LACTATE AND CALCIUM CHLORIDE: 600; 310; 30; 20 INJECTION, SOLUTION INTRAVENOUS at 10:36

## 2023-11-16 RX ADMIN — MEPIVACAINE HYDROCHLORIDE 10 ML: 15 INJECTION, SOLUTION EPIDURAL; INFILTRATION at 10:56

## 2023-11-16 RX ADMIN — Medication 10 MG: at 13:08

## 2023-11-16 RX ADMIN — Medication 10 MG: at 11:14

## 2023-11-16 RX ADMIN — FENOFIBRATE 160 MG: 160 TABLET ORAL at 15:39

## 2023-11-16 RX ADMIN — BUPIVACAINE HYDROCHLORIDE 10 ML: 2.5 INJECTION, SOLUTION INFILTRATION; PERINEURAL at 10:56

## 2023-11-16 RX ADMIN — FOLIC ACID 1 MG: 1 TABLET ORAL at 16:35

## 2023-11-16 RX ADMIN — ACETAMINOPHEN 1000 MG: 500 TABLET, FILM COATED ORAL at 10:30

## 2023-11-16 RX ADMIN — PHENYLEPHRINE HYDROCHLORIDE 100 MCG: 0.1 INJECTION, SOLUTION INTRAVENOUS at 12:05

## 2023-11-16 RX ADMIN — Medication 2000 MG: at 11:25

## 2023-11-16 RX ADMIN — OXYCODONE HYDROCHLORIDE 10 MG: 5 TABLET ORAL at 14:01

## 2023-11-16 RX ADMIN — PROPOFOL 160 MG: 10 INJECTION, EMULSION INTRAVENOUS at 11:14

## 2023-11-16 RX ADMIN — WATER 1000 MG: 1 INJECTION INTRAMUSCULAR; INTRAVENOUS; SUBCUTANEOUS at 21:53

## 2023-11-16 RX ADMIN — LIDOCAINE HYDROCHLORIDE 1 ML: 10 INJECTION, SOLUTION INFILTRATION; PERINEURAL at 10:37

## 2023-11-16 RX ADMIN — PHENYLEPHRINE HYDROCHLORIDE 30 MCG/MIN: 10 INJECTION INTRAVENOUS at 12:17

## 2023-11-16 RX ADMIN — ONDANSETRON 4 MG: 2 INJECTION INTRAMUSCULAR; INTRAVENOUS at 11:28

## 2023-11-16 RX ADMIN — GLYCOPYRROLATE 0.4 MG: 0.2 INJECTION INTRAMUSCULAR; INTRAVENOUS at 13:21

## 2023-11-16 RX ADMIN — PHENYLEPHRINE HYDROCHLORIDE 100 MCG: 0.1 INJECTION, SOLUTION INTRAVENOUS at 11:53

## 2023-11-16 RX ADMIN — PHENYLEPHRINE HYDROCHLORIDE 150 MCG: 0.1 INJECTION, SOLUTION INTRAVENOUS at 11:29

## 2023-11-16 RX ADMIN — OXYCODONE HYDROCHLORIDE 20 MG: 5 TABLET ORAL at 21:51

## 2023-11-16 RX ADMIN — MIDAZOLAM 2 MG: 1 INJECTION INTRAMUSCULAR; INTRAVENOUS at 10:58

## 2023-11-16 RX ADMIN — DEXAMETHASONE SODIUM PHOSPHATE 10 MG: 10 INJECTION INTRAMUSCULAR; INTRAVENOUS at 11:28

## 2023-11-16 ASSESSMENT — PAIN SCALES - GENERAL
PAINLEVEL_OUTOF10: 0
PAINLEVEL_OUTOF10: 5
PAINLEVEL_OUTOF10: 1
PAINLEVEL_OUTOF10: 3
PAINLEVEL_OUTOF10: 5
PAINLEVEL_OUTOF10: 2

## 2023-11-16 ASSESSMENT — PAIN DESCRIPTION - DESCRIPTORS
DESCRIPTORS: ACHING;JABBING;NAGGING
DESCRIPTORS: THROBBING

## 2023-11-16 ASSESSMENT — PAIN DESCRIPTION - LOCATION
LOCATION: INCISION;SHOULDER
LOCATION: SHOULDER

## 2023-11-16 ASSESSMENT — PAIN DESCRIPTION - ORIENTATION
ORIENTATION: LEFT
ORIENTATION: LEFT

## 2023-11-16 ASSESSMENT — PAIN - FUNCTIONAL ASSESSMENT: PAIN_FUNCTIONAL_ASSESSMENT: 0-10

## 2023-11-16 ASSESSMENT — LIFESTYLE VARIABLES: SMOKING_STATUS: 0

## 2023-11-16 NOTE — OP NOTE
[employer]  OPERATIVE REPORT    Name:  Heraclio Gaines  MR#:  [id number]  :  1960  ACCOUNT #:  [de-identified]  DATE OF SERVICE:  2023    PREOPERATIVE DIAGNOSES:  1. Closed three-part left proximal humerus fracture. 2.  Closed displaced oblique fracture of the left humeral shaft. 3.  Glenohumeral osteoarthritis, left shoulder. POSTOPERATIVE DIAGNOSES:  1. Closed three-part left proximal humerus fracture. 2.  Closed displaced oblique fracture of the left humeral shaft. 3.  Glenohumeral osteoarthritis, left shoulder. PROCEDURES PERFORMED:  1. Reverse left total shoulder arthroplasty with a long stem Delta Xtend prosthesis, biceps tenodesis. 2.  Open reduction and internal fixation, left humeral shaft fracture. SURGEON:  Pedro Ronquillo. Pancho Lewis MD        ANESTHESIA:  General with an interscalene block. COMPLICATIONS:  None. SPECIMENS REMOVED:    IMPLANTS:  Hardware utilized is a DePuy +10 metaglene, 38 eccentric +6 mm lateralized glenosphere, 38+9 cup, three +9 extenders, 8.1 long stem, 8 mm Biostop G, 50 mm superior, 40 mm inferior, and 18 mm anterior nonlocking cortical screw. ESTIMATED BLOOD LOSS:  100 mL. FINDINGS:  1. Three-part left proximal humerus fracture with underlying glenohumeral osteoarthritis. 2.  Displaced oblique humeral shaft fracture. CPT CODES:  [id number] and [id number]. ICD-10 CODES:  S42.232, Q04.359, M19.012. INDICATION:  The patient is a 59-year-old gentleman who had a syncopal episode who fell sustaining a closed three-part left proximal humerus fracture and a left humeral shaft fracture with underlying glenohumeral osteoarthritis. The patient was referred to me  due to the complexity of the fracture. The patient was now brought to the operative suite for operative intervention. PROCEDURE:  Following identification of the patient, the patient was taken to the operative suite.   Following induction of general anesthesia, interscalene block for postop pain control, 2 g of IV Ancef, and placement of Leon catheter, the patient was very carefully positioned on the operative table in the beach chair fashion. Left shoulder was then prepped and draped in the sterile fashion. Standard deltopectoral incision was identified and marked. Skin was incised. Subcutaneous tissue was then dissected down to the cephalic vein. This was dissected proximally and distally, retracted laterally with deltoid. Pec major and strap muscles were retracted medially. Biceps tendon was identified, dissected up through rotator interval.  It was tagged, transected for later tenodesis. Fracture hematoma was evacuated. The fracture fragments were identified. There was a three-part left proximal humerus fracture. There was an oblique extensile displaced humeral shaft fracture. At this point, the lesser tuberosity was then identified. It was tagged, debulked, and mobilized for later repair. Greater tuberosity was identified. It was debulked, mobilized, tagged for later repair. Humeral head was removed from the wound. There were degenerative changes of the humeral head and glenoid. The humeral shaft was then completely mobilized. A bone holding  clamp was utilized to reduce the oblique humeral shaft fracture to the stem. Attention was then turned to glenoid. There were marked degenerative changes on the glenoid as well. Glenoid was then meticulously exposed. Starter wire was then drilled. Glenoid was then drilled and reamed to a +10 depth. The inferior tilt was reamed in.  A +10 metaglene was inserted and secured with 50 mm superior, 40 mm inferior, and 18 mm anterior nonlocking cortical screw. Metaglene was stable. A 38 eccentric +6 mm lateralized glenosphere was inserted in the standard fashion. Metaglene and glenosphere were stable. Humerus was then dislocated back from the wound. It was reamed to accommodate an 8.1 long stem.   Again, the oblique humeral shaft

## 2023-11-16 NOTE — ANESTHESIA PROCEDURE NOTES
Peripheral Block    Patient location during procedure: holding area  Reason for block: post-op pain management and at surgeon's request  Start time: 11/16/2023 10:56 AM  End time: 11/16/2023 11:03 AM  Staffing  Performed: anesthesiologist   Anesthesiologist: Jamaica Goff MD  Performed by: Jamaica Goff MD  Authorized by: Jamaica Goff MD    Preanesthetic Checklist  Completed: patient identified, IV checked, site marked, risks and benefits discussed, surgical/procedural consents, equipment checked, pre-op evaluation, timeout performed, anesthesia consent given, oxygen available and monitors applied/VS acknowledged  Peripheral Block   Patient position: supine  Prep: ChloraPrep  Provider prep: mask and sterile gloves  Patient monitoring: cardiac monitor, continuous pulse ox, frequent blood pressure checks, IV access, oxygen and responsive to questions  Block type: Brachial plexus  Interscalene  Laterality: left  Injection technique: single-shot  Guidance: ultrasound guided  Local infiltration: lidocaine  Infiltration strength: 1 %  Local infiltration: lidocaine  Dose: 1 mL    Needle   Needle type: insulated echogenic nerve stimulator needle   Needle gauge: 21 G  Needle localization: anatomical landmarks, nerve stimulator and ultrasound guidance  Needle length: 5 cm  Assessment   Injection assessment: negative aspiration for heme, no paresthesia on injection, local visualized surrounding nerve on ultrasound and no intravascular symptoms  Paresthesia pain: none  Slow fractionated injection: yes  Hemodynamics: stable  Real-time US image taken/store: yes  Outcomes: uncomplicated    Additional Notes  Time out at 1056    20 mL 0.375% bupivacaine with 1:400,000 epinephrine and 10 mL 1.5% mepivacaine injected in incremental doses of 5 mL    Local anesthetic was visualized surrounding the nerve/block plane under real time ultrasound guidance. An image was obtained and placed on the chart.   The relevant block

## 2023-11-16 NOTE — CARE COORDINATION
MD order rec'd to arrange home health. Pt without preference towards provider. Referral sent to [facility]. Will follow until d/c.         11/16/23 1513   Service Assessment   Patient Orientation Alert and Oriented   Cognition Alert   History Provided By Patient   Services At/After Discharge   Transition of Care Consult (CM Consult) [facility];PT   Confirm Follow Up Transport Self   Condition of Participation: Discharge Planning   The Plan for Transition of Care is related to the following treatment goals: improve mobility   The Patient and/or Patient Representative was provided with a Choice of Provider? Patient   The Patient and/Or Patient Representative agree with the Discharge Plan? Yes   Freedom of Choice list was provided with basic dialogue that supports the patient's individualized plan of care/goals, treatment preferences, and shares the quality data associated with the providers?   Yes

## 2023-11-16 NOTE — ANESTHESIA PROCEDURE NOTES
Airway  Date/Time: 11/16/2023 11:16 AM  Urgency: elective    Airway not difficult    General Information and Staff    Patient location during procedure: OR  Performed: resident/CRNA   Performed by: DEV Taylor - CRNA  Authorized by: Nirmal Banks MD      Indications and Patient Condition  Indications for airway management: anesthesia and airway protection  Spontaneous Ventilation: absent  Sedation level: deep  Preoxygenated: yes  Patient position: sniffing  MILS not maintained throughout  Mask difficulty assessment: vent by bag mask + OA or adjuvant +/- NMBA    Final Airway Details  Final airway type: endotracheal airway      Successful airway: ETT  Cuffed: yes   Successful intubation technique: direct laryngoscopy  Facilitating devices/methods: intubating stylet  Endotracheal tube insertion site: oral  Blade: Neil  Blade size: #3  ETT size (mm): 8.0  Cormack-Lehane Classification: grade I - full view of glottis  Placement verified by: chest auscultation and capnometry   Measured from: lips  ETT to lips (cm): 23  Number of attempts at approach: 1    no

## 2023-11-16 NOTE — ANESTHESIA PRE PROCEDURE
Department of Anesthesiology  Preprocedure Note       Name:  Afshin Gil   Age:  58 y.o.  :  1960                                          MRN:  [id number]         Date:  2023      Surgeon: Maricruz Goodson):  Danyelle Sosa MD    Procedure: Procedure(s):  open reduction internal fixation of left humeral shaft fracture. general/interscalene. 23hr  left reverse total shoulder arthroplasty with a delta xtend prosthesis and biceps tenodesis in combination with a latissimus dorsi and teres major tendon transfer. general/interscalene. 23hr    Medications prior to admission:   Prior to Admission medications    Medication Sig Start Date End Date Taking?  Authorizing Provider   magnesium oxide (MAG-OX) 400 MG tablet Take 1 tablet by mouth in the morning and at bedtime    Mukesh Valdez MD   aspirin 81 MG EC tablet Take 1 tablet by mouth Daily with lunch    Mukesh Valdez MD   calcium carb-cholecalciferol 600-10 MG-MCG TABS per tab Take 1 tablet by mouth daily (with breakfast) 23   Mukesh Valdez MD   colchicine-probenecid 0.5-500 MG per tablet Take 1 tablet by mouth 2 times daily as needed 23   Mukesh Valdez MD   famotidine (PEPCID) 20 MG tablet Take 1 tablet by mouth 2 times daily 23  Mukesh Valdez MD   fenofibrate (TRICOR) 145 MG tablet Take 1 tablet by mouth Daily with lunch 10/19/23 10/18/24  Mukesh Valdez MD   folic acid (FOLVITE) 1 MG tablet Take 1 tablet by mouth Daily with lunch 23  Mukesh Valdez MD   metoprolol succinate (TOPROL XL) 100 MG extended release tablet Take 1 tablet by mouth Daily with lunch 10/19/23 10/18/24  Mukesh Valdez MD   omeprazole (PRILOSEC) 20 MG delayed release capsule Take 1 capsule by mouth in the morning and at bedtime 23   Mukesh Valdez MD   rosuvastatin (CRESTOR) 20 MG tablet Take 1 tablet by mouth Daily with lunch 10/19/23 10/18/24  Mukesh Valdez MD

## 2023-11-16 NOTE — H&P
Update History & Physical    The patient's History and Physical of November 6, 2023 was reviewed with the patient and I examined the patient. There was no change. The surgical site was confirmed by the patient and me. Plan: The risks, benefits, expected outcome, and alternative to the recommended procedure have been discussed with the patient. Patient understands and wants to proceed with the procedure.      Electronically signed by Iqra Car MD on 11/16/2023 at 10:35 AM

## 2023-11-16 NOTE — PROGRESS NOTES
TRANSFER - IN REPORT:    Verbal report received from PACU on Zia Phillips  being received from PACU for routine post-op      Report consisted of patient's Situation, Background, Assessment and   Recommendations(SBAR). Information from the following report(s) Surgery Report, Intake/Output, MAR, Recent Results, and Med Rec Status was reviewed with the receiving nurse. Opportunity for questions and clarification was provided. Assessment completed upon patient's arrival to unit and care assumed.

## 2023-11-16 NOTE — BRIEF OP NOTE
BRIEF OPERATIVE NOTE    Date of Procedure: 11/16/2023     Preoperative Diagnosis:  CLOSED 3 PART LEFT PROXIMAL HUMERUS FRACTURE  DISPLACED OBLIQUE LEFT HUMERAL SHAFT FRACTURE    GLENOHUMERAL OSTEOARTHRITIS LEFT SHOULDER     Postoperative Diagnosis:  SAME    Procedure(s)  1.  REVERSE LEFT TOTAL SHOULDER ARTHROPLASTY WITH LONG STEM DELTA EXTEND PROSTHESIS, BICEPS TENODESIS       2. OPEN REDUCTION, INTERNAL FIXATION LEFT HUMERAL SHAFT FRACTURE      Surgeon(s) and Role:     * Nohemy Malin MD - Primary         Assistant Staff:  NONE    Surgical Staff:  Circulator: Alber Arias RN  Surgical Assistant: Kylee Salinas Circulator: Izzy Hartman RN  Relief Scrub: Abundio Christine  Scrub Person First: Nathalia Potstarr  Scrub Person Second: Chucky Razor; Boneta Sorrel      * Missing procedure start or end time(s) *    Anesthesia:  GENERAL WITH INTERSCALENE BLOCK    Estimated Blood Loss: 100 CC. Complications: NONE    Implants:   Implant Name Type Inv. Item Serial No.  Lot No. LRB No. Used Action   COMPONENT BENITA FIX [id number] SHLDR METAGLENE LNG PEG GLOB - [id number]  COMPONENT BENITA FIX [id number] SHLDR METAGLENE LNG PEG GLOB  [facility] [id number] Left 1 Implanted   XTND GLENO ECC D38MM +6MM - [id number]  XTND GLENO ECC D38MM +6MM  [facility] DEPBreathe [employer] [facility] [id number] Left 1 Implanted   SCREW BNE L18MM DIA4. 5MM PROX MIGNON TIB S [facility] FULL - [id number]  SCREW BNE L18MM DIA4. 5MM PROX MIGNON TIB S STL [facility] FULL  DEPUY [employer] [place] [id number] Left 1 Implanted   SCREW BNE L40MM DIA4. 5MM PROX MIGNON TIB S ST[facility] FULL - [id number]  SCREW BNE L40MM DIA4. 5MM PROX MIGNON TIB S STL [facility] FULL  DEPUY [employer] [place] [id number] Left 1 Implanted   SCREW BNE L50MM DIA4. 5MM PROX MIGNON TIB S STL [facility] FULL - [id number]  SCREW BNE L50MM DIA4. 5MM PROX MIGNON TIB S STL [facility] FULL  DEPUY [employer] [place] [id number] Left 1 Implanted   STEM HUM SZ 1 DIA8MM LNG CO CHROM BRUNO [place] EPIPHYSIS - [id number]  STEM HUM SZ 1 DIA8MM LNG CO CHROM BRUNO [place] EPIPHYSIS  [facility] [employer]- [id number] Left 1 Implanted   RESTRICTOR BRUNO DIA8MM BIOABSRB HIP PLUG CLEARCUT - [id number]  RESTRICTOR BRUNO DIA8MM BIOABSRB HIP PLUG CLEARCUT  [employer] [facility]- [id number] Left 1 Implanted   CEMENT BNE 20ML 41GM FULL DOSE PMMA W/ TOBRA M VISC RADPQ - [id number]  CEMENT BNE 20ML 41GM FULL DOSE PMMA W/ TOBRA M VISC RADPQ  [employer] [facility] [id number] Left 1 Implanted   CEMENT BNE 20ML 41GM FULL DOSE PMMA W/ TOBRA M VISC RADPQ - [id number]  CEMENT BNE 20ML 41GM FULL DOSE PMMA W/ TOBRA M VISC RADPQ  [employer] [facility]- [id number] Left 1 Implanted   SPACER HUM +9MM OFFSET SHLDR POLYETH FOR DELT XTEND REV SYS - [id number]  SPACER HUM +9MM OFFSET SHLDR POLYETH FOR DELT XTEND REV SYS  [facility] DEPUY [employer]- [id number] Left 1 Implanted   SPACER HUM +9MM OFFSET SHLDR POLYETH FOR DELT XTEND REV SYS - [id number]  SPACER HUM +9MM OFFSET SHLDR POLYETH FOR DELT XTEND REV SYS  [facility] [employer]- [id number] Left 1 Implanted   SPACER HUM +9MM OFFSET SHLDR POLYETH FOR DELT XTEND REV SYS - [id number]  SPACER HUM +9MM OFFSET SHLDR POLYETH FOR DELT XTEND REV SYS  [facility] DEPUY [employer]- [id number] Left 1 Implanted   CUP HUM [id number] +9MM OFFSET STD SHLDR Elle Ranjan - [id number]  CUP HUM [id number] +9MM OFFSET STD [address] [facility] ORTHOPEDICS- [id number] Left 1 Implanted       Kamilla Ken MD

## 2023-11-16 NOTE — DISCHARGE SUMMARY
[address] Discharge Summary      Patient ID:  Melissa Menon  [id number]  58 y.o.  1960    Allergies: Patient has no known allergies. Admit date: 11/16/2023    Discharge date and time: 11/18/2023    Admitting Physician: Anirudh Lopez., MD     Discharge Physician: Anirudh Lopez MD      * Admission Diagnoses: Closed 3-part fracture of surgical neck of left humerus, initial encounter [S42.232A]  Osteoarthritis of left glenohumeral joint [M19.012]    * Discharge Diagnoses: Principal Problem:    Closed 3-part fracture of surgical neck of left humerus, initial encounter  Active Problems:    Closed 3-part fracture of surgical neck of left humerus    Displaced oblique fracture of shaft of left humerus    Osteoarthritis of left glenohumeral joint  Resolved Problems:    * No resolved hospital problems. *      Surgeon: Anirudh Lopez MD          * Procedure: Procedure(s) with comments:  open reduction internal fixation of left humeral shaft fracture. general/interscalene. 23hr - interscalene  left reverse total shoulder arthroplasty with a delta xtend prosthesis and biceps tenodesis in combination with a latissimus dorsi and teres major tendon transfer. general/interscalene. 23hr - interscalene           Perioperative Antibiotics: Ancef  _X__                                                Vancomycin  ___          Post Op complications: none        * Discharge Condition: Good  Wound appears to be healing without any evidence of infection.        * Discharged to: HOME    * Follow-up Care/Discharge instructions:  - Resume pre hospital diet            - Resume home medications per medical continuation form     CONTINUE PHYSICAL THERAPY  SLING LEFT SHOULDER  - Follow up in office as scheduled       Signed:  Anirudh Lopez MD  11/16/2023  10:35 AM

## 2023-11-16 NOTE — CONSULTS
[facility] Hospitalist Consult   Admit Date:  2023 10:00 AM   Name:  Zia Phillips   Age:  58 y.o. Sex:  male  :  1960   MRN:  [id number]   Room:  311/01    Presenting Complaint: No chief complaint on file. Reason(s) for Admission: Closed 3-part fracture of surgical neck of left humerus, initial encounter [S42.232A]  Osteoarthritis of left glenohumeral joint [M19.012]     Hospitalists consulted by Kale Albarran MD for: medical management    History of Presenting Illness:   Zia Phillips is a 58 y.o. male with history of HTN, HLD, BRIDGER, CHF/Systolic HF, GERD, OA, here for a surgical repair of the left humerus 2/2 fracture. He saw surgery in office on  and had exhausted all non-operative modalities. Agreeable to surgical intervention. Procedure w Dr Natividad Packer 23, appears to have tolerated well. Hospitalist were asked to see in consultation for home meds / medical management. Pt resting in bed. Denies SOB, CP, reflux, abdominal pain, HA, blurry vision, N/V/D and F/C, at time of my evaluation. He us afebrile and not needing oxygen. He is hungry and asking for some food. We are happy to assist in his care. Assessment & Plan:     Principal Problem:    Closed 3-part fracture of surgical neck of left humerus, initial encounter  Osteoarthritis of left glenohumeral joint  Displaced oblique fracture of shaft of left humerus  Plan: pt underwent a reverse left total shoulder arthroplasty w long stem delta extend prosthesis, biceps tenodesis and open reduction w internal fixation left humeral shaft frx, with Dr Natividad Packer on 23.  EBL 100cc  I rev'd labs and VS from today  I defer orders for PT/OT, antibx of choice, pain meds, DVT ppx, dressing changes / nursing care to surgical site; to the ortho team    Active Problems:     Primary hypertension  Plan: continue on metoprolol and entresto      Mixed hyperlipidemia  Plan: continue home crestor, fenofibrate      BRIDGER (iron deficiency

## 2023-11-17 LAB
ANION GAP SERPL CALC-SCNC: 9 MMOL/L (ref 2–11)
BUN SERPL-MCNC: 17 MG/DL (ref 8–23)
CALCIUM SERPL-MCNC: 8.8 MG/DL (ref 8.3–10.4)
CHLORIDE SERPL-SCNC: 100 MMOL/L (ref 101–110)
CO2 SERPL-SCNC: 22 MMOL/L (ref 21–32)
CREAT SERPL-MCNC: 1.43 MG/DL (ref 0.8–1.5)
ERYTHROCYTE [DISTWIDTH] IN BLOOD BY AUTOMATED COUNT: 14.7 % (ref 11.9–14.6)
GLUCOSE SERPL-MCNC: 141 MG/DL (ref 65–100)
HCT VFR BLD AUTO: 25 % (ref 41.1–50.3)
HGB BLD-MCNC: 8.1 G/DL (ref 13.6–17.2)
MAGNESIUM SERPL-MCNC: 1.9 MG/DL (ref 1.8–2.4)
MCH RBC QN AUTO: 31.2 PG (ref 26.1–32.9)
MCHC RBC AUTO-ENTMCNC: 32.4 G/DL (ref 31.4–35)
MCV RBC AUTO: 96.2 FL (ref 82–102)
MM INDURATION, POC: 0 MM (ref 0–5)
NRBC # BLD: 0 K/UL (ref 0–0.2)
PLATELET # BLD AUTO: 537 K/UL (ref 150–450)
PMV BLD AUTO: 9.7 FL (ref 9.4–12.3)
POTASSIUM SERPL-SCNC: 4.1 MMOL/L (ref 3.5–5.1)
PPD, POC: NEGATIVE
RBC # BLD AUTO: 2.6 M/UL (ref 4.23–5.6)
SODIUM SERPL-SCNC: 131 MMOL/L (ref 133–143)
WBC # BLD AUTO: 9.4 K/UL (ref 4.3–11.1)

## 2023-11-17 PROCEDURE — 6370000000 HC RX 637 (ALT 250 FOR IP): Performed by: ORTHOPAEDIC SURGERY

## 2023-11-17 PROCEDURE — 85027 COMPLETE CBC AUTOMATED: CPT

## 2023-11-17 PROCEDURE — 94760 N-INVAS EAR/PLS OXIMETRY 1: CPT

## 2023-11-17 PROCEDURE — 2700000000 HC OXYGEN THERAPY PER DAY

## 2023-11-17 PROCEDURE — 2580000003 HC RX 258: Performed by: ORTHOPAEDIC SURGERY

## 2023-11-17 PROCEDURE — 6360000002 HC RX W HCPCS: Performed by: ORTHOPAEDIC SURGERY

## 2023-11-17 PROCEDURE — 83735 ASSAY OF MAGNESIUM: CPT

## 2023-11-17 PROCEDURE — 97530 THERAPEUTIC ACTIVITIES: CPT

## 2023-11-17 PROCEDURE — 36415 COLL VENOUS BLD VENIPUNCTURE: CPT

## 2023-11-17 PROCEDURE — 94761 N-INVAS EAR/PLS OXIMETRY MLT: CPT

## 2023-11-17 PROCEDURE — 97161 PT EVAL LOW COMPLEX 20 MIN: CPT

## 2023-11-17 PROCEDURE — 1100000000 HC RM PRIVATE

## 2023-11-17 PROCEDURE — 80048 BASIC METABOLIC PNL TOTAL CA: CPT

## 2023-11-17 RX ORDER — NALOXONE HYDROCHLORIDE 4 MG/.1ML
1 SPRAY NASAL PRN
Qty: 1 EACH | Refills: 0 | Status: SHIPPED | OUTPATIENT
Start: 2023-11-17

## 2023-11-17 RX ORDER — OXYCODONE HYDROCHLORIDE 10 MG/1
10 TABLET ORAL EVERY 6 HOURS PRN
Qty: 40 TABLET | Refills: 0 | Status: SHIPPED | OUTPATIENT
Start: 2023-11-17 | End: 2023-11-27

## 2023-11-17 RX ORDER — PROMETHAZINE HYDROCHLORIDE 25 MG/1
25 TABLET ORAL EVERY 6 HOURS PRN
Qty: 20 TABLET | Refills: 0 | Status: SHIPPED | OUTPATIENT
Start: 2023-11-17

## 2023-11-17 RX ORDER — GABAPENTIN 100 MG/1
100 CAPSULE ORAL 3 TIMES DAILY
Qty: 90 CAPSULE | Refills: 0 | Status: SHIPPED | OUTPATIENT
Start: 2023-11-17 | End: 2023-12-17

## 2023-11-17 RX ADMIN — METOPROLOL SUCCINATE 100 MG: 25 TABLET, EXTENDED RELEASE ORAL at 13:12

## 2023-11-17 RX ADMIN — HYDROMORPHONE HYDROCHLORIDE 1 MG: 1 INJECTION, SOLUTION INTRAMUSCULAR; INTRAVENOUS; SUBCUTANEOUS at 10:03

## 2023-11-17 RX ADMIN — Medication 100 MG: at 07:38

## 2023-11-17 RX ADMIN — OXYCODONE HYDROCHLORIDE 20 MG: 5 TABLET ORAL at 07:38

## 2023-11-17 RX ADMIN — DOCUSATE SODIUM 100 MG: 100 CAPSULE, LIQUID FILLED ORAL at 07:38

## 2023-11-17 RX ADMIN — SACUBITRIL AND VALSARTAN 1 TABLET: 24; 26 TABLET, FILM COATED ORAL at 19:09

## 2023-11-17 RX ADMIN — ASPIRIN 81 MG: 81 TABLET, COATED ORAL at 13:12

## 2023-11-17 RX ADMIN — FOLIC ACID 1 MG: 1 TABLET ORAL at 07:38

## 2023-11-17 RX ADMIN — OXYCODONE HYDROCHLORIDE 20 MG: 5 TABLET ORAL at 13:20

## 2023-11-17 RX ADMIN — WATER 1000 MG: 1 INJECTION INTRAMUSCULAR; INTRAVENOUS; SUBCUTANEOUS at 13:12

## 2023-11-17 RX ADMIN — PROMETHAZINE HYDROCHLORIDE 25 MG: 25 TABLET ORAL at 10:03

## 2023-11-17 RX ADMIN — FERROUS SULFATE TAB 325 MG (65 MG ELEMENTAL FE) 325 MG: 325 (65 FE) TAB at 07:38

## 2023-11-17 RX ADMIN — ROSUVASTATIN 20 MG: 20 TABLET, FILM COATED ORAL at 13:12

## 2023-11-17 RX ADMIN — SACUBITRIL AND VALSARTAN 1 TABLET: 24; 26 TABLET, FILM COATED ORAL at 07:38

## 2023-11-17 RX ADMIN — FERROUS SULFATE TAB 325 MG (65 MG ELEMENTAL FE) 325 MG: 325 (65 FE) TAB at 17:52

## 2023-11-17 RX ADMIN — HYDROMORPHONE HYDROCHLORIDE 1 MG: 1 INJECTION, SOLUTION INTRAMUSCULAR; INTRAVENOUS; SUBCUTANEOUS at 19:09

## 2023-11-17 RX ADMIN — SODIUM CHLORIDE, PRESERVATIVE FREE 10 ML: 5 INJECTION INTRAVENOUS at 19:10

## 2023-11-17 RX ADMIN — WATER 1000 MG: 1 INJECTION INTRAMUSCULAR; INTRAVENOUS; SUBCUTANEOUS at 05:15

## 2023-11-17 RX ADMIN — OXYCODONE HYDROCHLORIDE 20 MG: 5 TABLET ORAL at 03:23

## 2023-11-17 RX ADMIN — FENOFIBRATE 160 MG: 160 TABLET ORAL at 13:12

## 2023-11-17 RX ADMIN — DOCUSATE SODIUM 100 MG: 100 CAPSULE, LIQUID FILLED ORAL at 19:09

## 2023-11-17 RX ADMIN — HYDROMORPHONE HYDROCHLORIDE 1 MG: 1 INJECTION, SOLUTION INTRAMUSCULAR; INTRAVENOUS; SUBCUTANEOUS at 05:23

## 2023-11-17 ASSESSMENT — PAIN SCALES - GENERAL
PAINLEVEL_OUTOF10: 6
PAINLEVEL_OUTOF10: 10
PAINLEVEL_OUTOF10: 6
PAINLEVEL_OUTOF10: 5
PAINLEVEL_OUTOF10: 3
PAINLEVEL_OUTOF10: 9

## 2023-11-17 ASSESSMENT — PAIN DESCRIPTION - ORIENTATION
ORIENTATION: LEFT
ORIENTATION: LEFT

## 2023-11-17 ASSESSMENT — PAIN DESCRIPTION - DESCRIPTORS
DESCRIPTORS: THROBBING
DESCRIPTORS: ACHING;SORE

## 2023-11-17 ASSESSMENT — PAIN DESCRIPTION - LOCATION
LOCATION: SHOULDER
LOCATION: SHOULDER

## 2023-11-17 NOTE — PROGRESS NOTES
Patient achieved  2200   Ml/sec on IS. Patient encouraged to do every hour while awake-patient agreed and demonstrated. No shortness of breath or distress noted.  BS are clear b/l.   notes reviewed- continuous sat ordered HS

## 2023-11-17 NOTE — PROGRESS NOTES
11/16/23 2107   Oxygen Therapy/Pulse Ox   O2 Therapy Oxygen humidified   $Oxygen $Daily Charge   O2 Device Heated high flow cannula   O2 Flow Rate (L/min) 30 L/min   FiO2  30 %   Pulse 88   Respirations 16   SpO2 100 %   Skin Assessment Clean, dry, & intact   Skin Protection for O2 Device N/A   Humidification Source Heated wire   Humidification Temp 31   Circuit Condensation Not drained   Pulse Oximeter Device Mode Continuous   $Pulse Oximeter $Spot check (multiple/continuous)     Patient placed on continuous sat monitor. Data cleared and alarms set. No distress noted at this time. Patient placed on Airvo on charted settings. Patient achieving [id number] on IS on demonstration with great efforts.

## 2023-11-17 NOTE — PROGRESS NOTES
ACUTE PHYSICAL THERAPY GOALS:   (Developed with and agreed upon by patient and/or caregiver.)  GOALS (1-4 days):  (1.)  Patient will move from supine to sit and sit to supine  in bed with INDEPENDENT. (2.)  Patient will transfer from bed to chair and chair to bed with INDEPENDENT using the least restrictive device. (3.)  Patient will ambulate with INDEPENDENT for 200 feet with the least restrictive device. (4.)  Patient will be independent with shoulder HEP to increase range of motion per MD orders. ________________________________________________________________________________________________     PHYSICAL THERAPY: SHOULDER Initial Assessment and AM  (Link to Caseload Tracking: PT Visit Days : 1  Acknowledge Orders Time In/Out  PT Charge Capture  Rehab Caseload Tracker    Boone Salazar is a 58 y.o. male   PRIMARY DIAGNOSIS: Closed 3-part fracture of surgical neck of left humerus, initial encounter  Closed 3-part fracture of surgical neck of left humerus, initial encounter [S42.232A]  Osteoarthritis of left glenohumeral joint [M19.012]  Procedure(s) (LRB):  OPEN REDUCTION, INTERNAL FIXATION LEFT HUMERAL SHAFT FRACTURE (Left)  1. REVERSE LEFT TOTAL SHOULDER ARTHROPLASTY WITH LONG STEM DELTA EXTEND PROSTHESIS, BICEPS TENODESIS (Left)  1 Day Post-Op  Reason for Referral: Pain in Left Shoulder (M25.512)  Stiffness of Left Shoulder, Not elsewhere classified (M25.612)  Difficulty in walking, Not elsewhere classified (R26.2)  Inpatient: Payor: Rubia Carrier / Plan: [employer] [place] / Product Type: *No Product type* /     MOVEMENT PRECAUTIONS   TWICE A DAY   Elbow, hand, gentle pendulums left shoulder. NO OTHER MOTION.    Nwb   Sling left shoulder     REHAB RECOMMENDATIONS:   Recommendation to date pending progress:  Setting:  Home Health Therapy    Equipment:    None     ASSESSMENT:   ASSESSMENT:  Mr. Kristy Mendieta presents with limited ROM and strength of L UE following his humeral fracture and subsequent L reverse Bearing: Non Weight Bearing     Restrictions/Precautions: Weight Bearing        HAND DOMINANCE:  Left []  Right []      UPPER EXTREMITY GROSS EVALUATION:   RIGHT UE   Within Functional Limits   Abnormal   Comments   Strength [x] []     Active Range of Motion [x] []     Passive Range of Motion           [x] []        LEFT UE Within Functional Limits   Abnormal   Comments   Strength [] [x]     Active Range of Motion [] [x]     Passive Range of Motion [] [x]       LOWER EXTREMITY GROSS EVALUATION:     Within Functional Limits   Abnormal   Comments   Strength [x] []    Range of Motion [x] []        COGNITION/  PERCEPTION: Intact Impaired (Comments):   Orientation [x]     Vision [x]     Hearing [x]     Cognition  [x]       MOBILITY: I Mod I S SBA CGA Min Mod Max Total  NT x2 Comments:   Bed Mobility    Rolling [] [] [] [] [x] [] [] [] [] [] []    Supine to Sit [] [] [] [] [] [x] [] [] [] [] []    Scooting [] [] [] [x] [] [] [] [] [] [] []    Sit to Supine [] [] [] [] [] [] [] [] [] [x] []    Transfers    Sit to Stand [] [] [] [] [x] [] [] [] [] [] []    Bed to Chair [] [] [] [] [x] [] [] [] [] [] []    Stand to Sit [] [] [] [] [x] [] [] [] [] [] []    Stand Pivot [] [] [] [] [] [] [] [] [] [] []    Toilet [] [] [] [] [] [] [] [] [] [] []     [] [] [] [] [] [] [] [] [] [] []    I=Independent, Mod I=Modified Independent, S=Supervision, SBA=Standby Assistance, CGA=Contact Guard Assistance,   Min=Minimal Assistance, Mod=Moderate Assistance, Max=Maximal Assistance, Total=Total Assistance, NT=Not Tested    GAIT: I Mod I S SBA CGA Min Mod Max Total  NT x2 Comments:   Level of Assistance [] [] [] [] [x] [x] [] [] [] [] []    Weightbearing Status  Restrictions/Precautions  Restrictions/Precautions: Weight Bearing    Distance  25 feet    Gait Quality Antalgic, Decreased ayaka , Decreased step length, and Path deviations     DME HHA       Stairs      I=Independent, Mod I=Modified Independent, S=Supervision, SBA=Standby

## 2023-11-17 NOTE — PROGRESS NOTES
Hospitalist Progress Note   Admit Date:  2023 10:00 AM   Name:  Brad Whitmore   Age:  58 y.o. Sex:  male  :  1960   MRN:  [id number]   Room:  [facility]/01    Presenting Complaint: No chief complaint on file. Reason(s) for Admission: Closed 3-part fracture of surgical neck of left humerus, initial encounter [S42.716A]  Osteoarthritis of left glenohumeral joint [M19.012]     Hospital Course:   Brad Whitmore is a 58 y.o. male with history of HTN, HLD, BRIDGER, CHF/Systolic HF, GERD, OA, here for a surgical repair of the left humerus 2/2 fracture. He saw surgery in office on  and had exhausted all non-operative modalities. Agreeable to surgical intervention. Procedure w Dr Graciela Tello 23, appears to have tolerated well. Hospitalist were asked to see in consultation for home meds / medical management. We are happy to assist in his care. Subjective & 24hr Events (23):   Post op day #1. Pt getting up w therapy in the room to work today. Ortho not planning on DC home until tomorrow (sat 23) afternoon. Afebrile. Not on o2. WBC ct 9.4(down from 12.4) and creatinine down to 1.4 (from 1.74 after some IV flds. Slight post op hgb drop from 8.5 -->8.1 today. No s/sx of active bleeding. Probably somewhat dilutional from the IV flds. AM labs ordered and pending results. Pt has no c/o today of sob, chest pain, N/V/D. His shoulder has the ace wrapping and sling on, some pain still. Leon out today to make sure pt can urinate prior to DC    Assessment & Plan:   Principal Problem:    Closed 3-part fracture of surgical neck of left humerus, initial encounter  Osteoarthritis of left glenohumeral joint  Displaced oblique fracture of shaft of left humerus  Plan: pt underwent a reverse left total shoulder arthroplasty w long stem delta extend prosthesis, biceps tenodesis and open reduction w internal fixation left humeral shaft frx, with Dr Graciela Tello on 23.  EBL 100cc  I rev'd labs and VS Fluoroscopy time: 3 seconds. Static images:  1. LEFT SHOULDER, 3 views. INDICATION: Postop shoulder arthroplasty. COMPARISON:  None. TECHNIQUE: AP, and oblique views. FINDINGS / IMPRESSION: There is a shoulder arthroplasty with a long humeral stem   in place. No acute bony fracture. .                Current Meds:  Current Facility-Administered Medications   Medication Dose Route Frequency    albuterol (PROVENTIL) (2.5 MG/3ML) 0.083% nebulizer solution 2.5 mg  2.5 mg Nebulization Q6H PRN    lactated ringers IV soln infusion   IntraVENous Continuous    aspirin EC tablet 81 mg  81 mg Oral Lunch    rosuvastatin (CRESTOR) tablet 20 mg  20 mg Oral Lunch    sacubitril-valsartan (ENTRESTO) 24-26 MG per tablet 1 tablet  1 tablet Oral BID    tuberculin injection 5 Units  5 Units IntraDERmal Once    ceFAZolin (ANCEF) 1,000 mg in sterile water 10 mL IV syringe  1,000 mg IntraVENous Q8H    docusate sodium (COLACE) capsule 100 mg  100 mg Oral BID    bisacodyl (DULCOLAX) suppository 10 mg  10 mg Rectal Daily PRN    HYDROmorphone (DILAUDID) injection 1 mg  1 mg IntraVENous Q2H PRN    ondansetron (ZOFRAN-ODT) disintegrating tablet 4 mg  4 mg Oral Q8H PRN    promethazine (PHENERGAN) tablet 25 mg  25 mg Oral Q4H PRN    sodium phosphate (FLEET) rectal enema 1 enema  1 enema Rectal Once PRN    ferrous sulfate (IRON 325) tablet 325 mg  325 mg Oral BID WC    sodium chloride flush 0.9 % injection 5-40 mL  5-40 mL IntraVENous 2 times per day    sodium chloride flush 0.9 % injection 5-40 mL  5-40 mL IntraVENous PRN    0.9 % sodium chloride infusion   IntraVENous PRN    oxyCODONE (ROXICODONE) immediate release tablet 10 mg  10 mg Oral Q3H PRN    oxyCODONE (ROXICODONE) immediate release tablet 20 mg  20 mg Oral Q3H PRN    thiamine tablet 100 mg  100 mg Oral Daily    folic acid (FOLVITE) tablet 1 mg  1 mg Oral Daily    chlordiazePOXIDE (LIBRIUM) capsule 10 mg  10 mg Oral Q4H PRN    fenofibrate (TRIGLIDE)

## 2023-11-17 NOTE — PROGRESS NOTES
ACUTE PHYSICAL THERAPY GOALS:   (Developed with and agreed upon by patient and/or caregiver.)  GOALS (1-4 days):  (1.)  Patient will move from supine to sit and sit to supine  in bed with INDEPENDENT. (2.)  Patient will transfer from bed to chair and chair to bed with INDEPENDENT using the least restrictive device. (3.)  Patient will ambulate with INDEPENDENT for 200 feet with the least restrictive device. (4.)  Patient will be independent with shoulder HEP to increase range of motion per MD orders. ________________________________________________________________________________________________     PHYSICAL THERAPY: SHOULDER Daily Note and PM  (Link to Caseload Tracking: PT Visit Days : 1  Acknowledge Orders Time In/Out  PT Charge Capture  Rehab Caseload Tracker    Nano Morse is a 58 y.o. male   PRIMARY DIAGNOSIS: Closed 3-part fracture of surgical neck of left humerus, initial encounter  Closed 3-part fracture of surgical neck of left humerus, initial encounter [S42.232A]  Osteoarthritis of left glenohumeral joint [M19.012]  Procedure(s) (LRB):  OPEN REDUCTION, INTERNAL FIXATION LEFT HUMERAL SHAFT FRACTURE (Left)  1. REVERSE LEFT TOTAL SHOULDER ARTHROPLASTY WITH LONG STEM DELTA EXTEND PROSTHESIS, BICEPS TENODESIS (Left)  1 Day Post-Op  Reason for Referral: Pain in Left Shoulder (M25.512)  Stiffness of Left Shoulder, Not elsewhere classified (M25.612)  Difficulty in walking, Not elsewhere classified (R26.2)  Inpatient: Payor: Rubia Tinoco / Plan: Krystin Melendez [place] / Product Type: *No Product type* /     MOVEMENT PRECAUTIONS   TWICE A DAY   Elbow, hand, gentle pendulums left shoulder. NO OTHER MOTION.    Nwb   Sling left shoulder     REHAB RECOMMENDATIONS:   Recommendation to date pending progress:  Setting:  Home Health Therapy    Equipment:    None     ASSESSMENT:   ASSESSMENT:  Mr. Kirit Thapa presents with limited ROM and strength of L UE following his humeral fracture and subsequent L reverse TSA and 10       Wrist Ulnar/Radial Deviation         Pronation/Supination 10aa 10aa       Elbow Flexion/Extension 10aa 10aa       Shoulder Flexion/Extension         Shoulder AB/ADduction         Shoulder IR/ER         Pulleys         Pendulums  10 CW and CCW       Shrugs 10 10       ISOMETRIC:                          Flexion         Extension         ABduction         ADduction         Biceps/Triceps         B = bilateral; AA = active assistive; A = active; P = passive      EDUCATION: Education Given To: Patient  Education Provided: Role of Therapy;Plan of Care;Home Exercise Program;Precautions;Transfer Training; Fall Prevention Strategies  Education Method: Demonstration;Verbal  Barriers to Learning: None  Education Outcome: Verbalized understanding;Demonstrated understanding  EDUCATION:  [x]  Home Exercises  [x]  Sling Application [x]  Movement Precautions   []  Pulleys [x]  Use of Ice  []  Other:      AFTER TREATMENT PRECAUTIONS: Bed, Bed/Chair Locked, Call light within reach, Needs within reach, RN at bedside, and RN notified    INTERDISCIPLINARY COLLABORATION:  RN/ PCT and PT/ PTA    COMPLIANCE WITH PROGRAM/EXERCISES: compliant all of the time, Will assess as treatment progresses. RECOMMENDATIONS/INTENT FOR NEXT TREATMENT SESSION: Treatment next visit will focus on increasing Mr. Guzman's independence with bed mobility, transfers, gait training, strength/ROM exercises, modalities for pain, and patient education.      TIME IN/OUT:  Time In: 1320  Time Out: [place]  Minutes: [address] [facility] PT

## 2023-11-18 VITALS
OXYGEN SATURATION: 98 % | TEMPERATURE: 99.1 F | RESPIRATION RATE: 20 BRPM | HEIGHT: 69 IN | DIASTOLIC BLOOD PRESSURE: 64 MMHG | SYSTOLIC BLOOD PRESSURE: 126 MMHG | BODY MASS INDEX: 24.51 KG/M2 | HEART RATE: 89 BPM | WEIGHT: 165.5 LBS

## 2023-11-18 LAB
ANION GAP SERPL CALC-SCNC: 7 MMOL/L (ref 2–11)
BUN SERPL-MCNC: 12 MG/DL (ref 8–23)
CALCIUM SERPL-MCNC: 9 MG/DL (ref 8.3–10.4)
CHLORIDE SERPL-SCNC: 100 MMOL/L (ref 101–110)
CO2 SERPL-SCNC: 24 MMOL/L (ref 21–32)
CREAT SERPL-MCNC: 1.14 MG/DL (ref 0.8–1.5)
ERYTHROCYTE [DISTWIDTH] IN BLOOD BY AUTOMATED COUNT: 14.9 % (ref 11.9–14.6)
GLUCOSE SERPL-MCNC: 161 MG/DL (ref 65–100)
HCT VFR BLD AUTO: 24 % (ref 41.1–50.3)
HGB BLD-MCNC: 7.7 G/DL (ref 13.6–17.2)
MAGNESIUM SERPL-MCNC: 2 MG/DL (ref 1.8–2.4)
MCH RBC QN AUTO: 30.7 PG (ref 26.1–32.9)
MCHC RBC AUTO-ENTMCNC: 32.1 G/DL (ref 31.4–35)
MCV RBC AUTO: 95.6 FL (ref 82–102)
NRBC # BLD: 0 K/UL (ref 0–0.2)
PLATELET # BLD AUTO: 451 K/UL (ref 150–450)
PMV BLD AUTO: 9.7 FL (ref 9.4–12.3)
POTASSIUM SERPL-SCNC: 4.1 MMOL/L (ref 3.5–5.1)
RBC # BLD AUTO: 2.51 M/UL (ref 4.23–5.6)
SODIUM SERPL-SCNC: 131 MMOL/L (ref 133–143)
WBC # BLD AUTO: 11.4 K/UL (ref 4.3–11.1)

## 2023-11-18 PROCEDURE — 6360000002 HC RX W HCPCS: Performed by: ORTHOPAEDIC SURGERY

## 2023-11-18 PROCEDURE — 6370000000 HC RX 637 (ALT 250 FOR IP): Performed by: ORTHOPAEDIC SURGERY

## 2023-11-18 PROCEDURE — 80048 BASIC METABOLIC PNL TOTAL CA: CPT

## 2023-11-18 PROCEDURE — 85027 COMPLETE CBC AUTOMATED: CPT

## 2023-11-18 PROCEDURE — 97530 THERAPEUTIC ACTIVITIES: CPT

## 2023-11-18 PROCEDURE — 83735 ASSAY OF MAGNESIUM: CPT

## 2023-11-18 PROCEDURE — 36415 COLL VENOUS BLD VENIPUNCTURE: CPT

## 2023-11-18 RX ORDER — PANTOPRAZOLE SODIUM 40 MG/1
40 TABLET, DELAYED RELEASE ORAL
Status: DISCONTINUED | OUTPATIENT
Start: 2023-11-18 | End: 2023-11-18 | Stop reason: HOSPADM

## 2023-11-18 RX ADMIN — OXYCODONE 10 MG: 5 TABLET ORAL at 00:18

## 2023-11-18 RX ADMIN — OXYCODONE HYDROCHLORIDE 20 MG: 5 TABLET ORAL at 10:23

## 2023-11-18 RX ADMIN — OXYCODONE HYDROCHLORIDE 20 MG: 5 TABLET ORAL at 07:34

## 2023-11-18 RX ADMIN — DOCUSATE SODIUM 100 MG: 100 CAPSULE, LIQUID FILLED ORAL at 07:34

## 2023-11-18 RX ADMIN — FOLIC ACID 1 MG: 1 TABLET ORAL at 07:34

## 2023-11-18 RX ADMIN — HYDROMORPHONE HYDROCHLORIDE 1 MG: 1 INJECTION, SOLUTION INTRAMUSCULAR; INTRAVENOUS; SUBCUTANEOUS at 01:03

## 2023-11-18 RX ADMIN — HYDROMORPHONE HYDROCHLORIDE 1 MG: 1 INJECTION, SOLUTION INTRAMUSCULAR; INTRAVENOUS; SUBCUTANEOUS at 02:55

## 2023-11-18 RX ADMIN — Medication 100 MG: at 07:34

## 2023-11-18 RX ADMIN — SACUBITRIL AND VALSARTAN 1 TABLET: 24; 26 TABLET, FILM COATED ORAL at 07:34

## 2023-11-18 RX ADMIN — PROMETHAZINE HYDROCHLORIDE 25 MG: 25 TABLET ORAL at 10:48

## 2023-11-18 RX ADMIN — FERROUS SULFATE TAB 325 MG (65 MG ELEMENTAL FE) 325 MG: 325 (65 FE) TAB at 07:34

## 2023-11-18 ASSESSMENT — PAIN DESCRIPTION - LOCATION
LOCATION: SHOULDER

## 2023-11-18 ASSESSMENT — PAIN SCALES - GENERAL
PAINLEVEL_OUTOF10: 5
PAINLEVEL_OUTOF10: 7
PAINLEVEL_OUTOF10: 5
PAINLEVEL_OUTOF10: 4
PAINLEVEL_OUTOF10: 2
PAINLEVEL_OUTOF10: 3
PAINLEVEL_OUTOF10: 4
PAINLEVEL_OUTOF10: 6
PAINLEVEL_OUTOF10: 8
PAINLEVEL_OUTOF10: 6

## 2023-11-18 ASSESSMENT — PAIN - FUNCTIONAL ASSESSMENT: PAIN_FUNCTIONAL_ASSESSMENT: ACTIVITIES ARE NOT PREVENTED

## 2023-11-18 ASSESSMENT — PAIN DESCRIPTION - DESCRIPTORS
DESCRIPTORS: THROBBING
DESCRIPTORS: ACHING

## 2023-11-18 ASSESSMENT — PAIN DESCRIPTION - ORIENTATION
ORIENTATION: LEFT

## 2023-11-18 NOTE — PROGRESS NOTES
Occupational Therapy Note:    OT orders received. Pt performing self care tasks with min assist and assist from spouse. Pt  independent with mobility. Pt scheduled to discharge home today. No OT indicated at this time. Will discharge OT.      Cosme Coombs OTR/L

## 2023-11-18 NOTE — PROGRESS NOTES
It is with great zeyad we pray for your family today: \"Because you dared to believe,    Your ernie has healed you. Go with peace in your heart,    And be free from your suffering! \"    Ana Laura Landry,    I believe that if you would touch my body I shall be healed. Give me the ernie to come boldly into your presence.     [facility]   [phone]

## 2023-11-18 NOTE — PROGRESS NOTES
conversational  CV:   RRR. No m/r/g. Lungs:   Respirations even and unlabored on RA  Extremities: LUE with sling intact  Skin:     Warm and dry. Neuro:  A&O x 4. No focal deficits  Psych:  Normal mood and affect.       I have personally reviewed labs and tests:  Recent Labs:  Recent Results (from the past 48 hour(s))   Hemoglobin    Collection Time: 11/16/23 10:35 AM   Result Value Ref Range    Hemoglobin 9.5 (L) 13.6 - 17.2 g/dL   TYPE AND SCREEN    Collection Time: 11/16/23 10:35 AM   Result Value Ref Range    Crossmatch expiration date 11/19/2023,2359     ABO/Rh A POSITIVE     Antibody Screen NEG    Hemoglobin A1C    Collection Time: 11/16/23 10:35 AM   Result Value Ref Range    Hemoglobin A1C 4.7 (L) 4.8 - 5.6 %    eAG 88 mg/dL   POCT Glucose    Collection Time: 11/16/23 10:42 AM   Result Value Ref Range    POC Glucose 108 (H) 65 - 100 mg/dL    Performed by: [facility]    POCT INR    Collection Time: 11/16/23 10:44 AM   Result Value Ref Range    POC Protime 13.5 (H) 9.6 - 11.6 SECS    POC INR 1.1 0.9 - 1.2     Basic Metabolic Panel    Collection Time: 11/16/23  1:51 PM   Result Value Ref Range    Sodium 132 (L) 133 - 143 mmol/L    Potassium 4.3 3.5 - 5.1 mmol/L    Chloride 104 101 - 110 mmol/L    CO2 21 21 - 32 mmol/L    Anion Gap 7 2 - 11 mmol/L    Glucose 133 (H) 65 - 100 mg/dL    BUN 17 8 - 23 MG/DL    Creatinine 1.74 (H) 0.8 - 1.5 MG/DL    Est, Glom Filt Rate 43 (L) >60 ml/min/1.73m2    Calcium 9.5 8.3 - 10.4 MG/DL   CBC with Auto Differential    Collection Time: 11/16/23  1:51 PM   Result Value Ref Range    WBC 12.4 (H) 4.3 - 11.1 K/uL    RBC 2.77 (L) 4.23 - 5.6 M/uL    Hemoglobin 8.5 (L) 13.6 - 17.2 g/dL    Hematocrit 26.8 (L) 41.1 - 50.3 %    MCV 96.8 82.0 - 102.0 FL    MCH 30.7 26.1 - 32.9 PG    MCHC 31.7 31.4 - 35.0 g/dL    RDW 14.7 (H) 11.9 - 14.6 %    Platelets 228 (H) 837 - 450 K/uL    MPV 9.3 (L) 9.4 - 12.3 FL    nRBC 0.00 0.0 - 0.2 K/uL    Differential Type AUTOMATED mg Oral Daily    chlordiazePOXIDE (LIBRIUM) capsule 10 mg  10 mg Oral Q4H PRN    fenofibrate (TRIGLIDE) tablet 160 mg  160 mg Oral Lunch    metoprolol succinate (TOPROL XL) extended release tablet 100 mg  100 mg Oral Lunch     Signed: Catalina Lovell AGACNP-BC

## 2023-11-18 NOTE — PROGRESS NOTES
ACUTE PHYSICAL THERAPY GOALS:   (Developed with and agreed upon by patient and/or caregiver.)  GOALS (1-4 days):  (1.)  Patient will move from supine to sit and sit to supine  in bed with INDEPENDENT. (2.)  Patient will transfer from bed to chair and chair to bed with INDEPENDENT using the least restrictive device. (3.)  Patient will ambulate with INDEPENDENT for 200 feet with the least restrictive device. (4.)  Patient will be independent with shoulder HEP to increase range of motion per MD orders. ________________________________________________________________________________________________     PHYSICAL THERAPY: SHOULDER Daily Note and AM  (Link to Caseload Tracking: PT Visit Days : 2  Acknowledge Orders Time In/Out  PT Charge Capture  Rehab Caseload Tracker    Liss Roberson is a 58 y.o. male   PRIMARY DIAGNOSIS: Closed 3-part fracture of surgical neck of left humerus, initial encounter  Closed 3-part fracture of surgical neck of left humerus, initial encounter [S42.232A]  Osteoarthritis of left glenohumeral joint [M19.012]  Procedure(s) (LRB):  OPEN REDUCTION, INTERNAL FIXATION LEFT HUMERAL SHAFT FRACTURE (Left)  1. REVERSE LEFT TOTAL SHOULDER ARTHROPLASTY WITH LONG STEM DELTA EXTEND PROSTHESIS, BICEPS TENODESIS (Left)  2 Days Post-Op  Reason for Referral: Pain in Left Shoulder (M25.512)  Stiffness of Left Shoulder, Not elsewhere classified (M25.612)  Difficulty in walking, Not elsewhere classified (R26.2)  Inpatient: Payor: Bibi  / Plan: Sandra Hernández [place] / Product Type: *No Product type* /     MOVEMENT PRECAUTIONS   TWICE A DAY   Elbow, hand, gentle pendulums left shoulder. NO OTHER MOTION.    Nwb   Sling left shoulder     REHAB RECOMMENDATIONS:   Recommendation to date pending progress:  Setting:  Home Health Therapy    Equipment:    None     ASSESSMENT:   ASSESSMENT:  Mr. Saman Hernandez presents with limited ROM and strength of L UE following his humeral fracture and subsequent L reverse TSA Weight Bearing     Restrictions/Precautions: Weight Bearing        HAND DOMINANCE:  Left []  Right []      UPPER EXTREMITY GROSS EVALUATION:   RIGHT UE   Within Functional Limits   Abnormal   Comments   Strength [x] []     Active Range of Motion [x] []     Passive Range of Motion           [x] []        LEFT UE Within Functional Limits   Abnormal   Comments   Strength [] [x]     Active Range of Motion [] [x]     Passive Range of Motion [] [x]       LOWER EXTREMITY GROSS EVALUATION:     Within Functional Limits   Abnormal   Comments   Strength [x] []    Range of Motion [x] []        COGNITION/  PERCEPTION: Intact Impaired (Comments):   Orientation [x]     Vision [x]     Hearing [x]     Cognition  [x]       MOBILITY: I Mod I S SBA CGA Min Mod Max Total  NT x2 Comments:   Bed Mobility    Rolling [x] [] [] [] [] [] [] [] [] [] []    Supine to Sit [x] [] [] [] [] [] [] [] [] [] []    Scooting [x] [] [] [] [] [] [] [] [] [] []    Sit to Supine [] [] [] [] [] [] [] [] [] [x] []    Transfers    Sit to Stand [x] [] [] [] [] [] [] [] [] [] []    Bed to Chair [] [] [] [] [] [] [] [] [] [] []    Stand to Sit [x] [] [] [] [] [] [] [] [] [] []    Stand Pivot [] [] [] [] [] [] [] [] [] [] []    Toilet [] [] [] [] [] [] [] [] [] [] []     [] [] [] [] [] [] [] [] [] [] []    I=Independent, Mod I=Modified Independent, S=Supervision, SBA=Standby Assistance, CGA=Contact Guard Assistance,   Min=Minimal Assistance, Mod=Moderate Assistance, Max=Maximal Assistance, Total=Total Assistance, NT=Not Tested    GAIT: I Mod I S SBA CGA Min Mod Max Total  NT x2 Comments:   Level of Assistance [x] [] [] [] [] [] [] [] [] [] []    Weightbearing Status       Distance  few steps at the bedside     Gait Quality Decreased ayaka  and Wide base of support    DME None     Stairs      I=Independent, Mod I=Modified Independent, S=Supervision, SBA=Standby Assistance, CGA=Contact Guard Assistance,   Min=Minimal Assistance, Mod=Moderate Assistance,

## 2023-11-19 ENCOUNTER — HOME CARE VISIT (OUTPATIENT)
Dept: SCHEDULING | Facility: HOME HEALTH | Age: 63
End: 2023-11-19
Payer: COMMERCIAL

## 2023-11-19 VITALS
HEART RATE: 102 BPM | OXYGEN SATURATION: 98 % | RESPIRATION RATE: 16 BRPM | SYSTOLIC BLOOD PRESSURE: 120 MMHG | DIASTOLIC BLOOD PRESSURE: 52 MMHG | TEMPERATURE: 97.3 F

## 2023-11-19 PROCEDURE — G0151 HHCP-SERV OF PT,EA 15 MIN: HCPCS

## 2023-11-19 ASSESSMENT — ENCOUNTER SYMPTOMS
PAIN LOCATION - PAIN QUALITY: ACHE
DYSPNEA ACTIVITY LEVEL: AFTER AMBULATING MORE THAN 20 FT

## 2023-11-20 NOTE — DISCHARGE SUMMARY
[address] SUMMARY    Name:  Elinor Ochoa  MR#:  [id number]  :  1960  ACCOUNT #:  [de-identified]  ADMIT DATE:  2023  DISCHARGE DATE:  2023    ADMISSION DIAGNOSES:  1. Closed three-part left proximal humerus fracture. 2.  Displaced oblique left proximal humerus shaft fracture. 3.  Glenohumeral osteoarthritis, left shoulder. DISCHARGE DIAGNOSES:  1. Closed three-part left proximal humerus fracture. 2.  Displaced oblique left proximal humeral shaft fracture. 3.  Glenohumeral osteoarthritis, left shoulder. Please see H and P, operative summaries, and consult for details. HOSPITAL COURSE:  The patient is a 67-year-old gentleman who was admitted on 2023, underwent an uncomplicated reverse left total shoulder arthroplasty with a long stem Delta Xtend prosthesis, biceps tenodesis, and ORIF of the left humeral shaft fracture. On postoperative day #1, his creatinine was 1.43, hemoglobin was 8.1. His pain was poorly controlled. On postoperative day #2, his creatinine was down to 1.14, his hemoglobin was 7.7, and his pain was controlled. He was discharged home on postoperative day #2. He will follow up in my office in 1 week. MD AYSE Rivera/[email or web address]_I/[email or web address]_P  D:  2023 6:34  T:  2023 7:47  JOB #:  [id number]  CC:   Anirudh Lopez MD

## 2023-11-21 ENCOUNTER — NURSE ONLY (OUTPATIENT)
Dept: INTERNAL MEDICINE CLINIC | Facility: CLINIC | Age: 63
End: 2023-11-21

## 2023-11-21 ENCOUNTER — OFFICE VISIT (OUTPATIENT)
Dept: ORTHOPEDIC SURGERY | Age: 63
End: 2023-11-21

## 2023-11-21 DIAGNOSIS — Z47.1 AFTERCARE FOLLOWING LEFT SHOULDER JOINT REPLACEMENT SURGERY: ICD-10-CM

## 2023-11-21 DIAGNOSIS — Z96.612 PRESENCE OF LEFT ARTIFICIAL SHOULDER JOINT: Primary | ICD-10-CM

## 2023-11-21 DIAGNOSIS — Z96.612 AFTERCARE FOLLOWING LEFT SHOULDER JOINT REPLACEMENT SURGERY: ICD-10-CM

## 2023-11-21 LAB
25(OH)D3 SERPL-MCNC: 22.4 NG/ML (ref 30–100)
ANION GAP SERPL CALC-SCNC: 8 MMOL/L (ref 2–11)
BASOPHILS # BLD: 0 K/UL (ref 0–0.2)
BASOPHILS NFR BLD: 0 % (ref 0–2)
BUN SERPL-MCNC: 15 MG/DL (ref 8–23)
CALCIUM SERPL-MCNC: 9.8 MG/DL (ref 8.3–10.4)
CHLORIDE SERPL-SCNC: 99 MMOL/L (ref 101–110)
CO2 SERPL-SCNC: 21 MMOL/L (ref 21–32)
CREAT SERPL-MCNC: 1.5 MG/DL (ref 0.8–1.5)
DIFFERENTIAL METHOD BLD: ABNORMAL
EOSINOPHIL # BLD: 0.2 K/UL (ref 0–0.8)
EOSINOPHIL NFR BLD: 3 % (ref 0.5–7.8)
ERYTHROCYTE [DISTWIDTH] IN BLOOD BY AUTOMATED COUNT: 15 % (ref 11.9–14.6)
GLUCOSE SERPL-MCNC: 115 MG/DL (ref 65–100)
HCT VFR BLD AUTO: 26.2 % (ref 41.1–50.3)
HGB BLD-MCNC: 8.3 G/DL (ref 13.6–17.2)
IMM GRANULOCYTES # BLD AUTO: 0.1 K/UL (ref 0–0.5)
IMM GRANULOCYTES NFR BLD AUTO: 1 % (ref 0–5)
LYMPHOCYTES # BLD: 2.4 K/UL (ref 0.5–4.6)
LYMPHOCYTES NFR BLD: 27 % (ref 13–44)
MAGNESIUM SERPL-MCNC: 2.2 MG/DL (ref 1.8–2.4)
MCH RBC QN AUTO: 30.9 PG (ref 26.1–32.9)
MCHC RBC AUTO-ENTMCNC: 31.7 G/DL (ref 31.4–35)
MCV RBC AUTO: 97.4 FL (ref 82–102)
MONOCYTES # BLD: 1.1 K/UL (ref 0.1–1.3)
MONOCYTES NFR BLD: 12 % (ref 4–12)
NEUTS SEG # BLD: 5.1 K/UL (ref 1.7–8.2)
NEUTS SEG NFR BLD: 57 % (ref 43–78)
NRBC # BLD: 0 K/UL (ref 0–0.2)
PLATELET # BLD AUTO: 579 K/UL (ref 150–450)
PMV BLD AUTO: 10 FL (ref 9.4–12.3)
POTASSIUM SERPL-SCNC: 4.6 MMOL/L (ref 3.5–5.1)
RBC # BLD AUTO: 2.69 M/UL (ref 4.23–5.6)
SODIUM SERPL-SCNC: 128 MMOL/L (ref 133–143)
WBC # BLD AUTO: 9 K/UL (ref 4.3–11.1)

## 2023-11-21 NOTE — PROGRESS NOTES
10/30/2023 demonstrates a type II acromion. Degenerative changes in the [facility] joint. Degenerative changes in the glenohumeral joint. A three-part comminuted left proximal's fracture involving surgical neck and greater tuberosity as well as an oblique proximal humeral shaft fracture         Impression:   1. Presence of left artificial shoulder joint    2. Aftercare following left shoulder joint replacement surgery       Stable status post reverse left total shoulder arthroplasty with a longstem delta xtend prosthesis biceps tenodesis and open reduction internal fixation left humeral shaft fracture 1 week  Glenohumeral osteoarthritis right shoulder  Hypertensive coronary disease  Congestive heart failure  Renal failure  Hyponatremia  Alcohol abuse  Cardiomyopathy  Gout  Hypercholesterolemia  History of pancreatitis  Chronic pain on narcotic pain medication in pain management followed by [facility] pain management takes Norco 5 mg tablets 3 times a day    Plan:   I discussed the plan with the patient. We redressed his left shoulder wound. He is doing very well. He will continue working on elbow hand wrist motion and gentle pendulums. I would like to measure a CBC, BMP, mag and vitamin D levels. I will recheck him back in 1 week for wound check suture removal with new AP, Y and axillary views left shoulder    Follow up: Return in about 8 days (around 11/29/2023).          Andrew Robles MD

## 2023-11-28 ENCOUNTER — OFFICE VISIT (OUTPATIENT)
Dept: ORTHOPEDIC SURGERY | Age: 63
End: 2023-11-28

## 2023-11-28 DIAGNOSIS — Z96.612 AFTERCARE FOLLOWING LEFT SHOULDER JOINT REPLACEMENT SURGERY: ICD-10-CM

## 2023-11-28 DIAGNOSIS — Z96.612 PRESENCE OF LEFT ARTIFICIAL SHOULDER JOINT: Primary | ICD-10-CM

## 2023-11-28 DIAGNOSIS — Z47.1 AFTERCARE FOLLOWING LEFT SHOULDER JOINT REPLACEMENT SURGERY: ICD-10-CM

## 2023-11-28 NOTE — PROGRESS NOTES
Name: Ebony Anderson  YOB: 1960  Gender: male  MRN: [id number]          HPI: Ebony Anderson is a 58 y.o. right-hand-dominant male 2 weeks status post reverse left total shoulder arthroplasty with a longstem delta xtend prosthesis biceps tenodesis and open reduction internal fixation left humeral shaft fracture. He returns and is doing very well. ROS/Meds/PSH/PMH/FH/SH: A ten system review of systems was performed and is negative other than what is in the HPI. Tobacco:  reports that he quit smoking about 21 years ago. His smoking use included cigarettes. He started smoking about 49 years ago. He has a 21.00 pack-year smoking history. He has been exposed to tobacco smoke. He has never used smokeless tobacco.  There were no vitals taken for this visit. Physical Examination:  He is an awake alert pleasant gentleman sitting in a wheelchair  He has restricted range of cervical spine motion without radicular findings    His right shoulder is 0 to 120 degrees of active and 0 to 140 degrees of passive forward elevation with marked pain  IR to GT  ER to 30  Right shoulder pain  He is neurovascularly intact      The left shoulder incision has healed  The suture was removed  He can fully extend his left elbow  He is neurovascularly intact    Data Reviewed:          XR: AP, Y and axillary views left shoulder   Clinical Indication    ICD-10-CM    1. Presence of left artificial shoulder joint  Z96.612 XR SHOULDER LEFT (MIN 2 VIEWS)      2. Aftercare following left shoulder joint replacement surgery  Z47.1 XR SHOULDER LEFT (MIN 2 VIEWS)    M87.257          Report: AP Y and axillary views left shoulder demonstrate a longstem reverse left total shoulder arthroplasty in excellent position. Tuberosities are seated. The humeral shaft fracture is anatomically reduced. Impression: As above   Licha Martínez MD       3D CT scan left shoulder dated 10/30/2023 demonstrates a type II acromion.

## 2023-11-29 ENCOUNTER — TELEPHONE (OUTPATIENT)
Dept: ORTHOPEDIC SURGERY | Age: 63
End: 2023-11-29

## 2023-11-29 ENCOUNTER — HOME CARE VISIT (OUTPATIENT)
Dept: SCHEDULING | Facility: HOME HEALTH | Age: 63
End: 2023-11-29
Payer: COMMERCIAL

## 2023-11-29 VITALS
TEMPERATURE: 97.6 F | HEART RATE: 80 BPM | SYSTOLIC BLOOD PRESSURE: 140 MMHG | DIASTOLIC BLOOD PRESSURE: 65 MMHG | OXYGEN SATURATION: 100 %

## 2023-11-29 DIAGNOSIS — Z96.612 PRESENCE OF LEFT ARTIFICIAL SHOULDER JOINT: Primary | ICD-10-CM

## 2023-11-29 PROCEDURE — G0151 HHCP-SERV OF PT,EA 15 MIN: HCPCS

## 2023-11-29 RX ORDER — OXYCODONE HYDROCHLORIDE 10 MG/1
10 TABLET ORAL EVERY 6 HOURS PRN
Qty: 40 TABLET | Refills: 0 | Status: SHIPPED | OUTPATIENT
Start: 2023-11-29 | End: 2023-12-09

## 2023-11-29 ASSESSMENT — ENCOUNTER SYMPTOMS: PAIN LOCATION - PAIN QUALITY: SHARP

## 2023-11-29 NOTE — TELEPHONE ENCOUNTER
He is requesting a refill on Oxycodone and gabapentin to [facility] in [facility] on [address].

## 2023-12-06 ENCOUNTER — HOME CARE VISIT (OUTPATIENT)
Dept: SCHEDULING | Facility: HOME HEALTH | Age: 63
End: 2023-12-06
Payer: COMMERCIAL

## 2023-12-06 VITALS
HEART RATE: 72 BPM | OXYGEN SATURATION: 99 % | SYSTOLIC BLOOD PRESSURE: 130 MMHG | TEMPERATURE: 97 F | DIASTOLIC BLOOD PRESSURE: 70 MMHG

## 2023-12-06 PROCEDURE — G0151 HHCP-SERV OF PT,EA 15 MIN: HCPCS

## 2023-12-11 DIAGNOSIS — Z96.612 PRESENCE OF LEFT ARTIFICIAL SHOULDER JOINT: Primary | ICD-10-CM

## 2023-12-12 RX ORDER — OXYCODONE HYDROCHLORIDE 10 MG/1
10 TABLET ORAL EVERY 6 HOURS PRN
Qty: 40 TABLET | Refills: 0 | Status: SHIPPED | OUTPATIENT
Start: 2023-12-12 | End: 2023-12-22

## 2024-01-02 ENCOUNTER — TELEPHONE (OUTPATIENT)
Dept: ORTHOPEDIC SURGERY | Age: 64
End: 2024-01-02

## 2024-01-02 DIAGNOSIS — Z96.612 AFTERCARE FOLLOWING LEFT SHOULDER JOINT REPLACEMENT SURGERY: ICD-10-CM

## 2024-01-02 DIAGNOSIS — Z96.612 PRESENCE OF LEFT ARTIFICIAL SHOULDER JOINT: Primary | ICD-10-CM

## 2024-01-02 DIAGNOSIS — Z47.1 AFTERCARE FOLLOWING LEFT SHOULDER JOINT REPLACEMENT SURGERY: ICD-10-CM

## 2024-01-02 RX ORDER — NALOXONE HYDROCHLORIDE 4 MG/.1ML
1 SPRAY NASAL PRN
Qty: 1 EACH | Refills: 0 | Status: SHIPPED | OUTPATIENT
Start: 2024-01-02

## 2024-01-02 RX ORDER — OXYCODONE HYDROCHLORIDE 10 MG/1
10 TABLET ORAL EVERY 6 HOURS PRN
Qty: 40 TABLET | Refills: 0 | Status: SHIPPED | OUTPATIENT
Start: 2024-01-02 | End: 2024-01-12

## 2024-01-02 NOTE — TELEPHONE ENCOUNTER
He is requesting a refill on Oxycodone to [facility] in [place] on [address]. He will run out in the morning.

## 2024-01-11 ENCOUNTER — TELEPHONE (OUTPATIENT)
Dept: ORTHOPEDIC SURGERY | Age: 64
End: 2024-01-11

## 2024-01-11 DIAGNOSIS — Z96.612 PRESENCE OF LEFT ARTIFICIAL SHOULDER JOINT: Primary | ICD-10-CM

## 2024-01-11 RX ORDER — OXYCODONE HYDROCHLORIDE 10 MG/1
10 TABLET ORAL EVERY 6 HOURS PRN
Qty: 40 TABLET | Refills: 0 | Status: SHIPPED | OUTPATIENT
Start: 2024-01-11 | End: 2024-01-21

## 2024-01-11 NOTE — TELEPHONE ENCOUNTER
He is requesting a refill on Oxycodone to [facility] in [place] on [address]. He will run out on Saturday.

## 2024-01-23 ENCOUNTER — TELEPHONE (OUTPATIENT)
Dept: ORTHOPEDIC SURGERY | Age: 64
End: 2024-01-23

## 2024-01-24 ENCOUNTER — TELEPHONE (OUTPATIENT)
Dept: ORTHOPEDIC SURGERY | Age: 64
End: 2024-01-24

## 2024-01-24 DIAGNOSIS — Z96.612 PRESENCE OF LEFT ARTIFICIAL SHOULDER JOINT: Primary | ICD-10-CM

## 2024-01-24 RX ORDER — OXYCODONE HYDROCHLORIDE 10 MG/1
10 TABLET ORAL EVERY 6 HOURS PRN
Qty: 40 TABLET | Refills: 0 | Status: SHIPPED | OUTPATIENT
Start: 2024-01-24 | End: 2024-02-03

## 2024-01-24 NOTE — TELEPHONE ENCOUNTER
He is calling again about his refill of oxycodone. He called yesterday and is completely out now. Please send to the pharmacy on file.

## 2024-02-02 ENCOUNTER — TELEPHONE (OUTPATIENT)
Dept: ORTHOPEDIC SURGERY | Age: 64
End: 2024-02-02

## 2024-02-02 DIAGNOSIS — Z47.1 AFTERCARE FOLLOWING LEFT SHOULDER JOINT REPLACEMENT SURGERY: ICD-10-CM

## 2024-02-02 DIAGNOSIS — Z96.612 PRESENCE OF LEFT ARTIFICIAL SHOULDER JOINT: Primary | ICD-10-CM

## 2024-02-02 DIAGNOSIS — Z96.612 AFTERCARE FOLLOWING LEFT SHOULDER JOINT REPLACEMENT SURGERY: ICD-10-CM

## 2024-02-02 RX ORDER — OXYCODONE HYDROCHLORIDE 10 MG/1
10 TABLET ORAL EVERY 6 HOURS PRN
Qty: 40 TABLET | Refills: 0 | Status: SHIPPED | OUTPATIENT
Start: 2024-02-02 | End: 2024-02-12

## 2024-02-02 NOTE — TELEPHONE ENCOUNTER
Needs Rx refill on oxy 10mg send to [facility] [address] in [place] will run out this w/e

## 2024-02-02 NOTE — TELEPHONE ENCOUNTER
He left a voicemail at 2:17 stating he needs a refill on his Oxy. He will run out over the weekend.

## 2024-02-13 ENCOUNTER — TELEPHONE (OUTPATIENT)
Dept: ORTHOPEDIC SURGERY | Age: 64
End: 2024-02-13

## 2024-02-13 DIAGNOSIS — Z96.612 PRESENCE OF LEFT ARTIFICIAL SHOULDER JOINT: Primary | ICD-10-CM

## 2024-02-13 RX ORDER — NALOXONE HYDROCHLORIDE 4 MG/.1ML
1 SPRAY NASAL PRN
Qty: 1 EACH | Refills: 0 | Status: SHIPPED | OUTPATIENT
Start: 2024-02-13

## 2024-02-13 RX ORDER — OXYCODONE HYDROCHLORIDE 10 MG/1
10 TABLET ORAL EVERY 6 HOURS PRN
Qty: 40 TABLET | Refills: 0 | Status: SHIPPED | OUTPATIENT
Start: 2024-02-13 | End: 2024-02-23

## 2024-02-13 NOTE — TELEPHONE ENCOUNTER
He is requesting a refill on Oxycodone to [facility] in [place] on [address].

## 2024-02-22 ENCOUNTER — TELEPHONE (OUTPATIENT)
Dept: ORTHOPEDIC SURGERY | Age: 64
End: 2024-02-22

## 2024-02-22 DIAGNOSIS — Z96.612 PRESENCE OF LEFT ARTIFICIAL SHOULDER JOINT: Primary | ICD-10-CM

## 2024-02-22 RX ORDER — OXYCODONE HYDROCHLORIDE 10 MG/1
10 TABLET ORAL EVERY 6 HOURS PRN
Qty: 40 TABLET | Refills: 0 | Status: SHIPPED | OUTPATIENT
Start: 2024-02-23 | End: 2024-03-04

## 2024-02-22 NOTE — TELEPHONE ENCOUNTER
Called and spoke to pt's wife and informed her that this will be last pain rx from Dr. Malin. Explained that he would need to make this last and follow up with pain management if he continues to have pain issues. She agreed. Rx routed.

## 2024-03-12 ENCOUNTER — OFFICE VISIT (OUTPATIENT)
Dept: ORTHOPEDIC SURGERY | Age: 64
End: 2024-03-12
Payer: COMMERCIAL

## 2024-03-12 DIAGNOSIS — Z96.612 PRESENCE OF LEFT ARTIFICIAL SHOULDER JOINT: Primary | ICD-10-CM

## 2024-03-12 DIAGNOSIS — Z09 FOLLOW-UP EXAMINATION AFTER ORTHOPEDIC SURGERY: ICD-10-CM

## 2024-03-12 PROCEDURE — 99212 OFFICE O/P EST SF 10 MIN: CPT | Performed by: ORTHOPAEDIC SURGERY

## 2024-03-12 NOTE — PROGRESS NOTES
demonstrates a type II acromion.  Degenerative changes in the AC joint.  Degenerative changes in the glenohumeral joint.  A three-part comminuted left proximal's fracture involving surgical neck and greater tuberosity as well as an oblique proximal humeral shaft fracture     His hemoglobin is elevated to 8.3  Sodium is 128  Creatinine 1.5  Vitamin D level 22.4    Impression:   1. Presence of left artificial shoulder joint    2. Follow-up examination after orthopedic surgery       Stable status post reverse left total shoulder arthroplasty with a longstem delta xtend prosthesis biceps tenodesis and open reduction internal fixation left humeral shaft fracture 4 months  Glenohumeral osteoarthritis right shoulder  Hypertensive coronary disease  Congestive heart failure  Renal failure  Hyponatremia  Alcohol abuse  Cardiomyopathy  Gout  Hypercholesterolemia  History of pancreatitis  Chronic pain on narcotic pain medication in pain management followed by [facility] pain management takes Norco 5 mg tablets 3 times a day    Plan:   I discussed the plan with the patient.  He is doing extremely well.  He will rehab on his own.  I will recheck him back in 3 months with new AP, Y and axillary views left shoulder    2.  Self-limited problem    Follow up: Return in about 3 months (around 6/12/2024).         HEBERT SOFIA JR, MD

## (undated) DEVICE — TOTAL SHOULDER DR POSTA: Brand: MEDLINE INDUSTRIES, INC.

## (undated) DEVICE — SUTURE N ABSRB BRAIDED 2-0 MO-6 39 IN 26 MM 1/2 CIR BLU BLK 3910900023

## (undated) DEVICE — PACKING WND W1INXL6FT VAG WVN COT GZ RADPQ

## (undated) DEVICE — 3M™ STERI-DRAPE™ INSTRUMENT POUCH 1018: Brand: STERI-DRAPE™

## (undated) DEVICE — APPLICATOR MEDICATED 26 CC SOLUTION HI LT ORNG CHLORAPREP

## (undated) DEVICE — BANDAGE COBAN 4 IN COMPR W4INXL5YD FOAM COHESIVE QUIK STK SELF ADH SFT

## (undated) DEVICE — SLING & SWATHE: Brand: DEROYAL

## (undated) DEVICE — GAUZE,SPONGE,4"X4",16PLY,STRL,LF,10/TRAY: Brand: MEDLINE

## (undated) DEVICE — DRAPE TWL SURG 16X26IN BLU ORB04] ALLCARE INC]

## (undated) DEVICE — SUTURE N ABSRB BRAIDED 5-0 CTX 39 IN 48 MM WHT BLK XBRAID S 3910900052

## (undated) DEVICE — TAPE FIBER CARCLAGE W/ TIGER LINK

## (undated) DEVICE — SHEET, DRAPE, SPLIT, STERILE: Brand: MEDLINE

## (undated) DEVICE — PAD,ABDOMINAL,5"X9",ST,LF,25/BX: Brand: MEDLINE INDUSTRIES, INC.

## (undated) DEVICE — GLOVE ORANGE PI 8 1/2   MSG9085

## (undated) DEVICE — BANDAGE,GAUZE,BULKEE II,4.5"X4.1YD,STRL: Brand: MEDLINE

## (undated) DEVICE — HIGH CAPACITY FAN SPRAY TIP WITH SHIELD: Brand: PULSAVAC®

## (undated) DEVICE — 1LYRTR 16FR10ML 100%SILI SNAP: Brand: MEDLINE INDUSTRIES, INC.

## (undated) DEVICE — GLOVE SURG SZ 6 THK91MIL LTX FREE SYN POLYISOPRENE ANTI

## (undated) DEVICE — CANISTER, RIGID, 2000CC: Brand: MEDLINE INDUSTRIES, INC.

## (undated) DEVICE — COVER,TABLE,HEAVY DUTY,79"X110",STRL: Brand: MEDLINE

## (undated) DEVICE — 3M™ STERI-DRAPE™ INCISE DRAPE 1050 (60CM X 45CM): Brand: STERI-DRAPE™

## (undated) DEVICE — DRAPE,TOP,102X53,STERILE: Brand: MEDLINE

## (undated) DEVICE — SOLUTION IRRIG 3000ML 0.9% SOD CHL USP UROMATIC PLAS CONT

## (undated) DEVICE — ELECTRODE NDL 2.8IN COAT VALLEYLAB

## (undated) DEVICE — STRIP,CLOSURE,WOUND,MEDI-STRIP,1/2X4: Brand: MEDLINE

## (undated) DEVICE — OSCILLATING TIP SAW CARTRIDGE: Brand: STRYKER PRECISION

## (undated) DEVICE — CARDINAL HEALTH FLEXIBLE LIGHT HANDLE COVER: Brand: CARDINAL HEALTH

## (undated) DEVICE — PENCIL ES L3M BTTN SWCH HOLSTER W/ BLDE ELECTRD EDGE

## (undated) DEVICE — SCREW BNE L14MM DIA4.5MM PROX CORT TIB S STL ST LOK FULL
Type: IMPLANTABLE DEVICE | Site: SHOULDER | Status: NON-FUNCTIONAL
Removed: 2023-11-16

## (undated) DEVICE — 4.0MM ROUND FAST CUTTING BUR

## (undated) DEVICE — SURGICAL PROCEDURE PACK BASIC ST FRANCIS

## (undated) DEVICE — GLOVE SURG SZ 6 L12IN FNGR THK79MIL GRN LTX FREE